# Patient Record
Sex: MALE | Race: OTHER | HISPANIC OR LATINO | Employment: FULL TIME | URBAN - METROPOLITAN AREA
[De-identification: names, ages, dates, MRNs, and addresses within clinical notes are randomized per-mention and may not be internally consistent; named-entity substitution may affect disease eponyms.]

---

## 2019-05-31 ENCOUNTER — HOSPITAL ENCOUNTER (EMERGENCY)
Facility: MEDICAL CENTER | Age: 34
End: 2019-05-31
Attending: EMERGENCY MEDICINE
Payer: OTHER MISCELLANEOUS

## 2019-05-31 VITALS
OXYGEN SATURATION: 94 % | BODY MASS INDEX: 38.95 KG/M2 | HEART RATE: 60 BPM | TEMPERATURE: 98.5 F | RESPIRATION RATE: 18 BRPM | WEIGHT: 219.8 LBS | HEIGHT: 63 IN | SYSTOLIC BLOOD PRESSURE: 153 MMHG | DIASTOLIC BLOOD PRESSURE: 108 MMHG

## 2019-05-31 DIAGNOSIS — S61.210A LACERATION OF RIGHT INDEX FINGER WITHOUT FOREIGN BODY WITHOUT DAMAGE TO NAIL, INITIAL ENCOUNTER: ICD-10-CM

## 2019-05-31 PROCEDURE — 700101 HCHG RX REV CODE 250: Performed by: EMERGENCY MEDICINE

## 2019-05-31 PROCEDURE — 99283 EMERGENCY DEPT VISIT LOW MDM: CPT

## 2019-05-31 PROCEDURE — 304999 HCHG REPAIR-SIMPLE/INTERMED LEVEL 1

## 2019-05-31 PROCEDURE — 303747 HCHG EXTRA SUTURE

## 2019-05-31 RX ORDER — LIDOCAINE HYDROCHLORIDE 10 MG/ML
20 INJECTION, SOLUTION INFILTRATION; PERINEURAL ONCE
Status: COMPLETED | OUTPATIENT
Start: 2019-05-31 | End: 2019-05-31

## 2019-05-31 RX ORDER — CEPHALEXIN 500 MG/1
500 CAPSULE ORAL 4 TIMES DAILY
Qty: 20 CAP | Refills: 0 | Status: SHIPPED | OUTPATIENT
Start: 2019-05-31 | End: 2019-06-05

## 2019-05-31 RX ADMIN — LIDOCAINE HYDROCHLORIDE 20 ML: 10 INJECTION, SOLUTION INFILTRATION; PERINEURAL at 23:00

## 2019-05-31 NOTE — LETTER
"  FORM C-4:  EMPLOYEE’S CLAIM FOR COMPENSATION/ REPORT OF INITIAL TREATMENT  EMPLOYEE’S CLAIM - PROVIDE ALL INFORMATION REQUESTED   First Name  Cornelio Last Name  Moise Birthdate             Age  1985 34 y.o. 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 [address]   What were you doing at the time of accident? (if applicable)  were pulling out a metal and the metal cut you    How did this injury or occupational disease occur? Be specific and answer in detail. Use additional sheet if necessary)  were pulling out a metal and the metal cut you   If you believe that you have an occupational disease, when did you first have knowledge of the disability and it relationship to your employment?  N/A Witnesses to the Accident  None     Nature of Injury or Occupational Disease  Workers' Compensation  Part(s) of Body Injured or Affected  Hand (R), Finger (R),     I certify that the above is true and correct to the best of my knowledge and that I " have provided this information in order to obtain the benefits of [place] or Chapter 617 of [place]  I hereby authorize any physician, chiropractor, surgeon, practitioner, or other person, any hospital, including [facility] hospital, any medical service organization, any insurance company, or other institution or organization to release to each other, any medical or other information, including benefits paid or payable, pertinent to this injury or disease, except information relative to diagnosis, treatment and/or counseling for AIDS, psychological conditions, alcohol or controlled substances, for which I must give specific authorization.  A Photostat of this authorization shall be as valid as the original.   Date  05/31/2019 [facility] ED Employee’s Signature   THIS REPORT MUST BE COMPLETED AND MAILED WITHIN 3 WORKING DAYS OF TREATMENT   Place  [facility], EMERGENCY DEPT  Name of Facility   [facility]   Date  5/31/2019 Diagnosis  (S61.210A) Laceration of right index finger without foreign body without damage to nail, initial encounter Is there evidence the injured employee was under the influence of alcohol and/or another controlled substance at the time of accident?   Hour  11:26 PM Description of Injury or Disease  Laceration of right index finger without foreign body without damage to nail, initial encounter No   Treatment  Right second finger laceration sutured, placed on 5-day course of Keflex  Have you advised the patient to remain off work five days or more?         No   X-Ray Findings      If Yes   From Date    To Date      From information given by the employee, together with medical evidence, can you directly connect this injury or occupational disease as job incurred?  Yes If No, is the employee capable of: Full Duty  No Modified Duty  Yes   Is  "additional medical care by a physician indicated?  Yes  Comments:Suture removal, possible need for wound check for any delayed healing or evidence of infection If Modified Duty, Specify any Limitations / Restrictions  Avoid lifting with injured finger 1 week unless cleared to resume normal activity by occupational medicine doctor     Do you know of any previous injury or disease contributing to this condition or occupational disease?  No   Date  5/31/2019 Print Doctor’s Name  Lenny Mock certify the employer’s copy of this form was mailed on:   Address  [address] [place] [address] [phone] Insurer’s Use Only   [facility]  [address]    Provider’s Tax ID Number  [id number] Telephone  Dept: [phone]    Doctor’s Signature  e-LENNY Ponce M.D. Degree   MD    Original - TREATING PHYSICIAN OR CHIROPRACTOR   Pg 2-Insurer/TPA   Pg 3-Employer   Pg 4-Employee                                                                                                  Form C-4 (rev01/03)     BRIEF DESCRIPTION OF RIGHTS AND BENEFITS  (Pursuant to NRS 616C.050)  Notice of Injury or Occupational Disease (Incident Report Form C-1): If an injury or occupational disease (OD) arises out of and in the course of employment, you must provide written notice to your employer as soon as practicable, but no later than 7 days after the accident or OD. Your employer shall maintain a sufficient supply of the required forms.  Claim for Compensation (Form C-4): If medical treatment is sought, the form C-4 is available at the place of initial treatment. A completed \"Claim for Compensation\" (Form C-4) must be filed within 90 days after an accident or OD. The treating physician or chiropractor must, within 3 working days after treatment, complete and mail to the employer, the employer's insurer and third-party , the Claim for Compensation.  Medical Treatment: If you require medical treatment " for your on-the-job injury or OD, you may be required to select a physician or chiropractor from a list provided by your workers’ compensation insurer, if it has contracted with an Organization for Managed Care (MCO) or Preferred Provider Organization (PPO) or providers of health care. If your employer has not entered into a contract with an MCO or PPO, you may select a physician or chiropractor from the Panel of Physicians and Chiropractors. Any medical costs related to your industrial injury or OD will be paid by your insurer.  Temporary Total Disability (TTD): If your doctor has certified that you are unable to work for a period of at least 5 consecutive days, or 5 cumulative days in a 20-day period, or places restrictions on you that your employer does not accommodate, you may be entitled to TTD compensation.  Temporary Partial Disability (TPD): If the wage you receive upon reemployment is less than the compensation for TTD to which you are entitled, the insurer may be required to pay you TPD compensation to make up the difference. TPD can only be paid for a maximum of 24 months.  Permanent Partial Disability (PPD): When your medical condition is stable and there is an indication of a PPD as a result of your injury or OD, within 30 days, your insurer must arrange for an evaluation by a rating physician or chiropractor to determine the degree of your PPD. The amount of your PPD award depends on the date of injury, the results of the PPD evaluation and your age and wage.  Permanent Total Disability (PTD): If you are medically certified by a treating physician or chiropractor as permanently and totally disabled and have been granted a PTD status by your insurer, you are entitled to receive monthly benefits not to exceed 66 2/3% of your average monthly wage. The amount of your PTD payments is subject to reduction if you previously received a PPD award.  Vocational Rehabilitation Services: You may be eligible for  vocational rehabilitation services if you are unable to return to the job due to a permanent physical impairment or permanent restrictions as a result of your injury or occupational disease.  Transportation and Per Saul Reimbursement: You may be eligible for travel expenses and per saul associated with medical treatment.  Reopening: You may be able to reopen your claim if your condition worsens after claim closure.  Appeal Process: If you disagree with a written determination issued by the insurer or the insurer does not respond to your request, you may appeal to the Department of Administration, , by following the instructions contained in your determination letter. You must appeal the determination within 70 days from the date of the determination letter at [address], [address], [place], [place] [address], or [address] [facility] [address], [place], [place] [address]. If you disagree with the  decision, you may appeal to the Department of Administration, . You must file your appeal within 30 days from the date of the  decision letter at [address], [address], [place], [place] [address], or [address], [address], [place], [place] [address]. If you disagree with a decision of an , you may file a petition for judicial review with the District Court. You must do so within 30 days of the Appeal Officer’s decision. You may be represented by an  at your own expense or you may contact the [facility] for possible representation.  [place]  for Injured Workers (NAIW): If you disagree with a  decision, you may request that NAIW represent you without charge at an  Hearing. For information regarding denial of benefits, you may contact the [facility] at: [address], [address], [place] [address], [phone], or [address], [address], [place] [address], [phone]  [phone]  To File a Complaint with the Division: If you wish to file a complaint with the  of the Division of Industrial Relations (DIR), please contact the Workers’ Compensation Section, [address], [address], [place], [place] [address], telephone ([phone], or [address] [facility] [address], [address], [place], [place] [address], telephone [phone].  For assistance with Workers’ Compensation Issues: you may contact the Office of the Governor Consumer Health Assistance, [address] [place] [address], [address], [place], [place] [address], Toll Free 1-923.709.3183, Web site: http://Frankly.[facility].nv.us, E-mail [email or web address].                                                                                                                                                                                     __________________________________________________________________                                    _________________            Employee Name / Signature                                                                                                                            Date       05/31/2019                                D-2 (rev. 10/07)

## 2019-05-31 NOTE — LETTER
"  FORM C-4:  EMPLOYEE’S CLAIM FOR COMPENSATION/ REPORT OF INITIAL TREATMENT  EMPLOYEE’S CLAIM - PROVIDE ALL INFORMATION REQUESTED   First Name  Cornelio Last Name  Moise Birthdate             Age  1985 34 y.o. Sex  male Claim Number   Home Employee Address  [address]  [facility]                                     Zip  [address] Height  1.6 m (5' 3\") Weight  99.7 kg (219 lb 12.8 oz) N  [id number]   Mailing Employee Address                           [address]   [facility]               Zip  [address] Telephone  [phone] (home)  Primary Language Spoken  ENGLISH   Insurer  *** Third Party   N/A Employee's Occupation (Job Title) When Injury or Occupational Disease Occurred     Employer's Name   Telephone      Employer Address   City   State   Zip     Date of Injury  5/31/2019       Hour of Injury  9:45 PM Date Employer Notified  5/31/2019 Last Day of Work after Injury or Occupational Disease  5/31/2019 Supervisor to Whom Injury Reported  Wisam Dunlap   Address or Location of Accident (if applicable)     What were you doing at the time of accident? (if applicable)  were pulling out a metal and the metal cut you    How did this injury or occupational disease occur? Be specific and answer in detail. Use additional sheet if necessary)  were pulling out a metal and the metal cut you   If you believe that you have an occupational disease, when did you first have knowledge of the disability and it relationship to your employment?  N/A Witnesses to the Accident  None     Nature of Injury or Occupational Disease  Workers' Compensation  Part(s) of Body Injured or Affected  Hand (R), Finger (R), N/A    I certify that the above is true and correct to the best of my knowledge and that I have provided this information in order to obtain the benefits of [place]’s [place] ([place]A [place], inclusive or Chapter 617 of [place]RS).  " I hereby authorize any physician, chiropractor, surgeon, practitioner, or other person, any hospital, including [facility] or [facility], any medical service organization, any insurance company, or other institution or organization to release to each other, any medical or other information, including benefits paid or payable, pertinent to this injury or disease, except information relative to diagnosis, treatment and/or counseling for AIDS, psychological conditions, alcohol or controlled substances, for which I must give specific authorization.  A Photostat of this authorization shall be as valid as the original.   Date Place   Employee’s Signature   THIS REPORT MUST BE COMPLETED AND MAILED WITHIN 3 WORKING DAYS OF TREATMENT   Place  [facility], EMERGENCY DEPT  Name of Facility   [facility]   Date  5/31/2019 Diagnosis  (S61.210A) Laceration of right index finger without foreign body without damage to nail, initial encounter Is there evidence the injured employee was under the influence of alcohol and/or another controlled substance at the time of accident?   Hour  11:20 PM Description of Injury or Disease  Laceration of right index finger without foreign body without damage to nail, initial encounter     Treatment     Have you advised the patient to remain off work five days or more?             X-Ray Findings      If Yes   From Date    To Date      From information given by the employee, together with medical evidence, can you directly connect this injury or occupational disease as job incurred?    If No, is the employee capable of: Full Duty    Modified Duty      Is additional medical care by a physician indicated?    If Modified Duty, Specify any Limitations / Restrictions        Do you know of any previous injury or disease contributing to this condition or occupational disease?      Date  5/31/2019 Print Doctor’s Name  Lenny Mock I certify  "the employer’s copy of this form was mailed on:   Address  [address]  [place] [id number] [phone] Insurer’s Use Only   [facility]  [address]    Provider’s Tax ID Number  [id number] Telephone  Dept: [phone]    Doctor’s Signature    Degree       Original - TREATING PHYSICIAN OR CHIROPRACTOR   Pg 2-Insurer/TPA   Pg 3-Employer   Pg 4-Employee                                                                                                  Form C-4 (rev01/03)     BRIEF DESCRIPTION OF RIGHTS AND BENEFITS  ([place]ursuant to [place]RS 616C.050)    Notice of Injury or Occupational Disease (Incident Report Form C-1): If an injury or occupational disease (OD) arises out of and in the course of employment, you must provide written notice to your employer as soon as practicable, but no later than 7 days after the accident or OD. Your employer shall maintain a sufficient supply of the required forms.    Claim for Compensation (Form C-4): If medical treatment is sought, the form C-4 is available at the place of initial treatment. A completed \"Claim for Compensation\" (Form C-4) must be filed within 90 days after an accident or OD. The treating physician or chiropractor must, within 3 working days after treatment, complete and mail to the employer, the employer's insurer and third-party , the Claim for Compensation.    Medical Treatment: If you require medical treatment for your on-the-job injury or OD, you may be required to select a physician or chiropractor from a list provided by your workers’ compensation insurer, if it has contracted with an Organization for Managed Care (MCO) or Preferred Provider Organization (PPO) or providers of health care. If your employer has not entered into a contract with an MCO or PPO, you may select a physician or chiropractor from the Panel of Physicians and Chiropractors. Any medical costs related to your industrial injury or OD will be paid by your " insurer.    Temporary Total Disability (TTD): If your doctor has certified that you are unable to work for a period of at least 5 consecutive days, or 5 cumulative days in a 20-day period, or places restrictions on you that your employer does not accommodate, you may be entitled to TTD compensation.    Temporary Partial Disability (TPD): If the wage you receive upon reemployment is less than the compensation for TTD to which you are entitled, the insurer may be required to pay you TPD compensation to make up the difference. TPD can only be paid for a maximum of 24 months.    Permanent Partial Disability (PPD): When your medical condition is stable and there is an indication of a PPD as a result of your injury or OD, within 30 days, your insurer must arrange for an evaluation by a rating physician or chiropractor to determine the degree of your PPD. The amount of your PPD award depends on the date of injury, the results of the PPD evaluation and your age and wage.    Permanent Total Disability (PTD): If you are medically certified by a treating physician or chiropractor as permanently and totally disabled and have been granted a PTD status by your insurer, you are entitled to receive monthly benefits not to exceed 66 2/3% of your average monthly wage. The amount of your PTD payments is subject to reduction if you previously received a PPD award.    Vocational Rehabilitation Services: You may be eligible for vocational rehabilitation services if you are unable to return to the job due to a permanent physical impairment or permanent restrictions as a result of your injury or occupational disease.    Transportation and Per Saul Reimbursement: You may be eligible for travel expenses and per saul associated with medical treatment.  Reopening: You may be able to reopen your claim if your condition worsens after claim closure.    Appeal Process: If you disagree with a written determination issued by the insurer or the  insurer does not respond to your request, you may appeal to the Department of Administration, , by following the instructions contained in your determination letter. You must appeal the determination within 70 days from the date of the determination letter at [address], [address], [place], [place] [address], or [address], [address], [place], [place] [address]. If you disagree with the  decision, you may appeal to the Department of Administration, . You must file your appeal within 30 days from the date of the  decision letter at [address], [address], [place], [place] [address], or [address], [address], [place], [place] [address]. If you disagree with a decision of an , you may file a petition for judicial review with the District Court. You must do so within 30 days of the Appeal Officer’s decision. You may be represented by an  at your own expense or you may contact the [facility] for possible representation.    [place]  for Injured Workers ([place]IW): If you disagree with a  decision, you may request that NAIW represent you without charge at an  Hearing. For information regarding denial of benefits, you may contact the [facility] at: [address], [address], [place] [address], [phone], or [address], [address], [place] [address], [phone]    To File a Complaint with the Division: If you wish to file a complaint with the  of the Division of Industrial Relations (DIR), please contact the Workers’ Compensation Section, [address], [address], [place], [place] [address], telephone ([phone]) [phone], or [address], [place] [address], telephone [phone].    For assistance with Workers’ Compensation Issues: you may contact the Office of the [facility] Consumer Health Assistance, [address], [address], [place], [place] [address], Toll Free 1-300.315.7803, Web site: http://julissa.[facility].nv., E-mail [email or web address].[email or web address].                                                                                                                                                                               __________________________________________________________________                                    _________________            Employee Name / Signature                                                                                                                            Date                                       D-2 (rev. 10/07)

## 2019-06-01 NOTE — ED TRIAGE NOTES
"Cornelio Phipps   34 y.o. male   Chief Complaint   Patient presents with   • Laceration     right index finger laceration      Pt amb to triage with steady gait for above complaint. Says he was working with metal and cut his finger. Bleeding controlled in triage.      Pt is alert and oriented, speaking in full sentences, follows commands and responds appropriately to questions. NAD. Resp are even and unlabored.   Pt placed in lobby. Pt educated on triage process. Pt encouraged to alert staff for any changes.    /100   Pulse 60   Temp 36.9 °C (98.5 °F) (Temporal)   Resp 18   Ht 1.6 m (5' 3\")   Wt 99.7 kg (219 lb 12.8 oz)   SpO2 94%   BMI 38.94 kg/m²     "

## 2019-06-01 NOTE — DISCHARGE INSTRUCTIONS
Sutures out in 8 days    Follow-up at occupational medicine clinic    Return for any concern of infection    Avoid lifting with injured finger

## 2019-06-01 NOTE — ED PROVIDER NOTES
"ED Provider Note    CHIEF COMPLAINT   Chief Complaint   Patient presents with   • Laceration     right index finger laceration       ODILIA Phipps is a 34 y.o. male who presents with right second finger laceration, after it was accidentally dragged across a sharp piece of metal.  Last tetanus shot within the past 5 years.  Pain is minimal.  No loss of function of the finger, able to fully flex and extend.  Injury occurred 2 hours ago, it was work-related    REVIEW OF SYSTEMS   Musculoskeletal: Right second finger pain  Neurologic: No numbness  Skin: Right second finger laceration      PAST MEDICAL HISTORY   History reviewed. No pertinent past medical history.    FAMILY HISTORY  No family history on file.    SOCIAL HISTORY  Social History     Social History   • Marital status:      Spouse name: N/A   • Number of children: N/A   • Years of education: N/A     Social History Main Topics   • Smoking status: Current Every Day Smoker   • Smokeless tobacco: Not on file      Comment: occassional   • Alcohol use Yes      Comment: occassional   • Drug use: No   • Sexual activity: Not on file     Other Topics Concern   • Not on file     Social History Narrative   • No narrative on file       SURGICAL HISTORY  History reviewed. No pertinent surgical history.    CURRENT MEDICATIONS   Home Medications    **Home medications have not yet been reviewed for this encounter**         ALLERGIES   Allergies   Allergen Reactions   • Nkda [No Known Drug Allergy]        PHYSICAL EXAM  VITAL SIGNS: /100   Pulse 60   Temp 36.9 °C (98.5 °F) (Temporal)   Resp 18   Ht 1.6 m (5' 3\")   Wt 99.7 kg (219 lb 12.8 oz)   SpO2 94%   BMI 38.94 kg/m²   Skin: Dorsal flap-like laceration across the proximal phalanx, laceration approximately 2.5 cm long.  No evidence of foreign body on examination.   Vascular: Intact distal capillary refill.   Musculoskeletal: Flexion and extension of the injured finger is normal.  No bony " abnormality/deformity.  Neurologic: Patient is intact to the right fingertip    RADIOLOGY/PROCEDURES  Laceration Repair Procedure Note    Indication: Laceration    Procedure: The patient was placed in the appropriate position and anesthesia around the laceration was obtained by infiltration using 1% Lidocaine without epinephrine. The area was then cleansed with betadine and draped in a sterile fashion and irrigated with high pressure normal saline. The laceration was closed with 4-0 Ethilon using interrupted sutures. There were no additional lacerations requiring repair. The wound area was then dressed with bacitracin and a bandage.      Total repaired wound length: 2.5 cm.     Other Items: Suture count: 4    The patient tolerated the procedure well.    Complications: None          COURSE & MEDICAL DECISION MAKING  Pertinent Labs & Imaging studies reviewed. (See chart for details)  Laceration did not cross the joint.  Sutures placed, patient to have sutures out in 8 days.  He will follow-up with occupational medicine.  He is advised not to lift with the injured finger until sutures removed or cleared to do so earlier by occupational medicine physician.  Signs and symptoms of infection were reviewed with the patient.  He is advised to return for any concern of this.    FINAL IMPRESSION     1. Laceration of right index finger without foreign body without damage to nail, initial encounter              Electronically signed by: Lenny Mock, 5/31/2019 10:38 PM

## 2019-06-01 NOTE — ED NOTES
Pt understands discharge instructions follow up and prescriptions, wound irrigated, bandage placed, atb ointment applied.

## 2019-06-24 ENCOUNTER — HOSPITAL ENCOUNTER (EMERGENCY)
Facility: MEDICAL CENTER | Age: 34
End: 2019-06-24
Attending: EMERGENCY MEDICINE
Payer: COMMERCIAL

## 2019-06-24 ENCOUNTER — APPOINTMENT (OUTPATIENT)
Dept: RADIOLOGY | Facility: MEDICAL CENTER | Age: 34
End: 2019-06-24
Attending: EMERGENCY MEDICINE
Payer: COMMERCIAL

## 2019-06-24 VITALS
RESPIRATION RATE: 18 BRPM | OXYGEN SATURATION: 97 % | HEART RATE: 64 BPM | DIASTOLIC BLOOD PRESSURE: 90 MMHG | HEIGHT: 63 IN | BODY MASS INDEX: 39.61 KG/M2 | WEIGHT: 223.55 LBS | TEMPERATURE: 98.4 F | SYSTOLIC BLOOD PRESSURE: 135 MMHG

## 2019-06-24 DIAGNOSIS — L02.91 PHLEGMON: ICD-10-CM

## 2019-06-24 DIAGNOSIS — L02.91 ABSCESS: ICD-10-CM

## 2019-06-24 LAB — GLUCOSE BLD-MCNC: 93 MG/DL (ref 65–99)

## 2019-06-24 PROCEDURE — A9270 NON-COVERED ITEM OR SERVICE: HCPCS | Performed by: EMERGENCY MEDICINE

## 2019-06-24 PROCEDURE — 700102 HCHG RX REV CODE 250 W/ 637 OVERRIDE(OP): Performed by: EMERGENCY MEDICINE

## 2019-06-24 PROCEDURE — 99285 EMERGENCY DEPT VISIT HI MDM: CPT

## 2019-06-24 PROCEDURE — 303977 HCHG I & D

## 2019-06-24 PROCEDURE — 76536 US EXAM OF HEAD AND NECK: CPT

## 2019-06-24 PROCEDURE — 82962 GLUCOSE BLOOD TEST: CPT

## 2019-06-24 RX ORDER — SULFAMETHOXAZOLE AND TRIMETHOPRIM 800; 160 MG/1; MG/1
1 TABLET ORAL 2 TIMES DAILY
Qty: 14 TAB | Refills: 0 | Status: SHIPPED | OUTPATIENT
Start: 2019-06-24 | End: 2019-07-01

## 2019-06-24 RX ORDER — HYDROCODONE BITARTRATE AND ACETAMINOPHEN 5; 325 MG/1; MG/1
1 TABLET ORAL EVERY 6 HOURS PRN
Qty: 16 TAB | Refills: 0 | Status: SHIPPED | OUTPATIENT
Start: 2019-06-24 | End: 2019-06-28

## 2019-06-24 RX ORDER — CEPHALEXIN 500 MG/1
500 CAPSULE ORAL 3 TIMES DAILY
Qty: 21 CAP | Refills: 0 | Status: SHIPPED | OUTPATIENT
Start: 2019-06-24 | End: 2019-07-01

## 2019-06-24 RX ORDER — CEPHALEXIN 500 MG/1
500 CAPSULE ORAL ONCE
Status: COMPLETED | OUTPATIENT
Start: 2019-06-24 | End: 2019-06-24

## 2019-06-24 RX ORDER — SULFAMETHOXAZOLE AND TRIMETHOPRIM 800; 160 MG/1; MG/1
1 TABLET ORAL ONCE
Status: COMPLETED | OUTPATIENT
Start: 2019-06-24 | End: 2019-06-24

## 2019-06-24 RX ORDER — OXYCODONE HYDROCHLORIDE AND ACETAMINOPHEN 5; 325 MG/1; MG/1
1 TABLET ORAL ONCE
Status: COMPLETED | OUTPATIENT
Start: 2019-06-24 | End: 2019-06-24

## 2019-06-24 RX ADMIN — SULFAMETHOXAZOLE AND TRIMETHOPRIM 1 TABLET: 800; 160 TABLET ORAL at 23:20

## 2019-06-24 RX ADMIN — OXYCODONE HYDROCHLORIDE AND ACETAMINOPHEN 1 TABLET: 5; 325 TABLET ORAL at 23:21

## 2019-06-24 RX ADMIN — CEPHALEXIN 500 MG: 500 CAPSULE ORAL at 23:20

## 2019-06-25 ENCOUNTER — HOSPITAL ENCOUNTER (EMERGENCY)
Facility: MEDICAL CENTER | Age: 34
End: 2019-06-25
Attending: EMERGENCY MEDICINE
Payer: COMMERCIAL

## 2019-06-25 VITALS
RESPIRATION RATE: 16 BRPM | SYSTOLIC BLOOD PRESSURE: 142 MMHG | TEMPERATURE: 96.9 F | OXYGEN SATURATION: 98 % | WEIGHT: 222.88 LBS | BODY MASS INDEX: 39.49 KG/M2 | HEART RATE: 61 BPM | DIASTOLIC BLOOD PRESSURE: 96 MMHG | HEIGHT: 63 IN

## 2019-06-25 DIAGNOSIS — Z51.89 ENCOUNTER FOR WOUND RE-CHECK: ICD-10-CM

## 2019-06-25 PROCEDURE — 99283 EMERGENCY DEPT VISIT LOW MDM: CPT

## 2019-06-25 NOTE — ED PROVIDER NOTES
ED Provider Note    HPI: Patient is a 34-year-old male who presented to the emergency department June 24, 2019 at 7:50 PM with a chief complaint of neck pain.    Patient has swelling and discomfort in the right side of his neck posteriorly.  The triage note reflects left-sided pain but this is an error.  Symptoms began several days ago.  Has had no nausea or vomiting.  No fever no chills no cough.  No chest pain no shortness of breath.  No other somatic complaint    Review of Systems: Positive right-sided posterior neck pain negative for nausea vomiting fever chills cough chest pain shortness of breath.  Review of systems reviewed with patient, all other systems negative    Past medical/surgical history: None    Medications: None    Allergies: None    Social History: Patient smokes 1/2 pack of cigarettes per day occasional use of alcohol      Physical exam: Constitutional: Pleasant male awake alert  Vital signs: Temperature 98.8 blood pressure 152/100 pulse 86 respirations 16 pulse oximetry 95%  EYES: PERRL, EOMI, Conjunctivae and sclera normal, eyelids normal bilaterally.  Neck: Trachea midline.  Indurated and tender area right posterior lateral neck region.  No drainage or fluctuance is present except at the lateralmost portion of the area where there was some fluctuance present.  No evidence of abnormality in the cervical midline.  Cardiac: Regular rate and rhythm. S1-S2 present. No S3 or S4 present. No murmurs, rubs, or gallops heard. No edema or varicosities were seen.   Lungs: Clear to auscultation with good aeration throughout. No wheezes, rales, or rhonchi heard. Patient's chest wall moved symmetrically with each respiratory effort. Patient was not making use of accessory muscles of respiration in breathing.  Abdomen: Soft nontender to palpation. No rebound or guarding elicited. No organomegaly identified. No pulsatile abdominal masses identified.   Musculoskeletal:  no  pain with palpitation or movement of  muscle, bone or joint , no obvious musculoskeletal deformities identified.  Neurologic: alert and awake answers questions appropriately. Moves all four extremities independently, no gross focal abnormalities identified. Normal strength and motor.  Skin: See cervical exam.  No other rash or lesion seen, no other palpable dermatologic lesions identified.  Psychiatric: not anxious, delusional, or hallucinating.    Medical decision making: I was uncertain as to whether this represented an abscess or phlegmon.  Ultrasound obtained; complex hypoechoic structure was noted within the subcutaneous soft tissues of the right posterior neck.  This felt to be represent either developing abscess or phlegmon    Fingerstick blood sugar obtained, 93    I discussed the findings and ultrasound with the patient.  There was a portion on the right lateral neck that I felt was slightly fluctuant.  I felt this was the most logical area to attempt drainage.  Patient anesthetized with injection of a total of 4 cc 1% lidocaine without epinephrine.  Using a #11 scalpel blade the fluctuant area was incised and copious foul-smelling yellow pus was expressed.  Iodoform packing 1/4 inch was placed.  Dressing applied.    Patient be discharged on Bactrim Keflex and Norco.  The patient is to return tomorrow for recheck.  I told the patient there is a risk that he will develop an increased infection.  He is given his first dose of antibiotics in the department.  The patient drove here so he cannot be given any oral pain medications in the department.  I did dispense 1 Percocet tablet to him (witnessed by nursing staff but dispensed by me) to take once he arrives home.    Patient given discharge instructions for abscess care.  Patient verbalized understanding of the above instructions including the importance of returning to the ED tomorrow for recheck    Impression neck abscess, I&D  2) neck phlegmon

## 2019-06-25 NOTE — DISCHARGE PLANNING
Called patient per request of Dr. Arana and left VM to please call.  Dr. Arana would like patient to follow up today again for his abscess.

## 2019-06-25 NOTE — ED TRIAGE NOTES
"Cornelio Phipps   34 y.o. male   Chief Complaint   Patient presents with   • Abscess     left neck. says he could not wait till nect friday to see his doctor. unable to turn his head completely.      Pt amb to triage with steady gait for above complaint.      Pt is alert and oriented, speaking in full sentences, follows commands and responds appropriately to questions. NAD. Resp are even and unlabored.   Pt placed in lobby. Pt educated on triage process. Pt encouraged to alert staff for any changes.    /100   Pulse 86   Temp 37.1 °C (98.8 °F) (Temporal)   Resp 16   Ht 1.6 m (5' 3\")   Wt 101.4 kg (223 lb 8.7 oz)   SpO2 95%   BMI 39.60 kg/m²     "

## 2019-06-25 NOTE — ED NOTES
4x4 gauze and tegaderm applied to patient neck wound.  Cornelio Moise discharged via ambulatory with steady gait.  Discharge instructions given and reviewed, patient educated to follow up with ER tomorrow, verbalized understanding.  Prescriptions given x 3.  All personal belongings in possession.  No questions at this time.

## 2019-06-25 NOTE — DISCHARGE PLANNING
Patient returned call and will come in, as requested, sometime this afternoon.  He is currently at work and will come when able.  Dr. Lewis updated.

## 2019-06-26 NOTE — ED NOTES
Wound redressed.  Pt discharged, discharge instructions given. Verbalizes understanding. VSS on RA. All belongings accounted for. Pt ambulated with steady gait to ED lobby.

## 2019-06-26 NOTE — ED PROVIDER NOTES
"ED Provider Note    CHIEF COMPLAINT  Chief Complaint   Patient presents with   • Wound Re-Check     Seen at 6:01 PM    HPI  Cornelio Phipps is a 34 y.o. male who presents for a wound recheck.  He was seen yesterday approximately 23 hours ago, he had a large abscess drained on the right posterior side of his neck.  The wound was packed and he was discharged with Bactrim, Keflex and Norco.  The patient has been taking the medications.  He feels the symptoms have possibly improved slightly, certainly they are no worse than they were yesterday he denies any fevers, chills or impaired immunity.    REVIEW OF SYSTEMS  See HPI  PAST MEDICAL HISTORY   Denies    SOCIAL HISTORY  Social History     Social History Main Topics   • Smoking status: Never Smoker   • Smokeless tobacco: Never Used      Comment: occassional   • Alcohol use Yes      Comment: occassional   • Drug use: No   • Sexual activity: Not on file       SURGICAL HISTORY  patient denies any surgical history    CURRENT MEDICATIONS  Reviewed.  See Encounter Summary.     ALLERGIES  Allergies   Allergen Reactions   • Nkda [No Known Drug Allergy]        PHYSICAL EXAM  VITAL SIGNS: /96   Pulse 61   Temp 36.1 °C (96.9 °F) (Temporal)   Resp 16   Ht 1.6 m (5' 3\")   Wt 101.1 kg (222 lb 14.2 oz)   SpO2 98%   BMI 39.48 kg/m²   Constitutional: Awake, alert in no apparent distress.  HENT: Normocephalic, Bilateral external ears normal. Nose normal.  Neck is supple with full range of motion.  No cervical lymphadenopathy.  Eyes: Conjunctiva normal, non-icteric, EOMI.    Thorax & Lungs: Easy unlabored respirations  Cardiovascular:    Abdomen:  No distention  Skin: Approximately 4 cm area of induration, mild erythema and a stab incision centrally over the right posterior neck.  The gauze is removed and a small amount of purulence and serous drainage was expressed.  The overlying skin has some mild warmth.  There appears to be some tiny little pustular lesions surrounding " the stab incision.  Extremities:   atraumatic   Neurologic: Alert, Grossly non-focal.   Psychiatric: Affect and Mood normal    RADIOLOGY  No orders to display       Nursing notes and vital signs were reviewed. (See chart for details)    Decision Making:  This is a 34 y.o. year old male who presents for a wound check.  It is unlikely that he will have any significant improvement in 23 hours.  There has been no worsening of his symptoms, he does not have any signs of a deep space abscess, the neck is supple and he is able to range it without any pain.  A topical antibiotic was applied, he should continue the Keflex and Bactrim.  I explained that it may take 2 weeks for complete resolution, he should notice significant improvement in his symptoms in the next 24 to 48 hours.  If he notes any fevers, worsening pain, pain with range of motion or any other concern he needs to return immediately for repeat evaluation.    DISPOSITION:  Patient will be discharged home in good condition.    Discharge Medications:  New Prescriptions    No medications on file       The patient was discharged home (see d/c instructions) and told to return immediately for any signs or symptoms listed, or any worsening at all.  The patient verbally agreed to the discharge precautions and follow-up plan which is documented in EPIC.          FINAL IMPRESSION  1. Encounter for wound re-check

## 2019-06-26 NOTE — ED TRIAGE NOTES
33 y/o male ambulatory to triage for a recheck of a wound on the right side of his neck. Pt was seen yesterday and had the area lanced and drained, packing in place with dressing.

## 2020-11-27 ENCOUNTER — HOSPITAL ENCOUNTER (OUTPATIENT)
Dept: LAB | Facility: MEDICAL CENTER | Age: 35
End: 2020-11-27
Attending: NURSE PRACTITIONER
Payer: COMMERCIAL

## 2020-11-27 LAB — COVID ORDER STATUS COVID19: NORMAL

## 2020-11-27 PROCEDURE — U0003 INFECTIOUS AGENT DETECTION BY NUCLEIC ACID (DNA OR RNA); SEVERE ACUTE RESPIRATORY SYNDROME CORONAVIRUS 2 (SARS-COV-2) (CORONAVIRUS DISEASE [COVID-19]), AMPLIFIED PROBE TECHNIQUE, MAKING USE OF HIGH THROUGHPUT TECHNOLOGIES AS DESCRIBED BY CMS-2020-01-R: HCPCS

## 2020-11-27 PROCEDURE — C9803 HOPD COVID-19 SPEC COLLECT: HCPCS

## 2020-11-28 LAB
SARS-COV-2 RNA RESP QL NAA+PROBE: DETECTED
SPECIMEN SOURCE: ABNORMAL

## 2021-08-27 ENCOUNTER — OFFICE VISIT (OUTPATIENT)
Dept: URGENT CARE | Facility: CLINIC | Age: 36
End: 2021-08-27
Payer: COMMERCIAL

## 2021-08-27 ENCOUNTER — APPOINTMENT (OUTPATIENT)
Dept: URGENT CARE | Facility: CLINIC | Age: 36
End: 2021-08-27
Payer: COMMERCIAL

## 2021-08-27 ENCOUNTER — HOSPITAL ENCOUNTER (OUTPATIENT)
Facility: MEDICAL CENTER | Age: 36
End: 2021-08-27
Attending: NURSE PRACTITIONER
Payer: COMMERCIAL

## 2021-08-27 VITALS
WEIGHT: 220 LBS | RESPIRATION RATE: 16 BRPM | OXYGEN SATURATION: 97 % | DIASTOLIC BLOOD PRESSURE: 86 MMHG | HEIGHT: 64 IN | BODY MASS INDEX: 37.56 KG/M2 | TEMPERATURE: 99.1 F | HEART RATE: 70 BPM | SYSTOLIC BLOOD PRESSURE: 122 MMHG

## 2021-08-27 DIAGNOSIS — J06.9 VIRAL UPPER RESPIRATORY TRACT INFECTION WITH COUGH: ICD-10-CM

## 2021-08-27 DIAGNOSIS — J02.0 ACUTE STREPTOCOCCAL PHARYNGITIS: ICD-10-CM

## 2021-08-27 PROCEDURE — U0005 INFEC AGEN DETEC AMPLI PROBE: HCPCS

## 2021-08-27 PROCEDURE — U0003 INFECTIOUS AGENT DETECTION BY NUCLEIC ACID (DNA OR RNA); SEVERE ACUTE RESPIRATORY SYNDROME CORONAVIRUS 2 (SARS-COV-2) (CORONAVIRUS DISEASE [COVID-19]), AMPLIFIED PROBE TECHNIQUE, MAKING USE OF HIGH THROUGHPUT TECHNOLOGIES AS DESCRIBED BY CMS-2020-01-R: HCPCS

## 2021-08-27 PROCEDURE — 99204 OFFICE O/P NEW MOD 45 MIN: CPT | Performed by: NURSE PRACTITIONER

## 2021-08-27 PROCEDURE — C9803 HOPD COVID-19 SPEC COLLECT: HCPCS

## 2021-08-27 RX ORDER — AMOXICILLIN 500 MG/1
500 CAPSULE ORAL 2 TIMES DAILY
Qty: 20 CAPSULE | Refills: 0 | Status: SHIPPED | OUTPATIENT
Start: 2021-08-27 | End: 2021-09-06

## 2021-08-27 RX ORDER — HYDROXYZINE PAMOATE 25 MG/1
CAPSULE ORAL
COMMUNITY
Start: 2021-06-17

## 2021-08-28 DIAGNOSIS — J06.9 VIRAL UPPER RESPIRATORY TRACT INFECTION WITH COUGH: ICD-10-CM

## 2021-08-28 LAB — COVID ORDER STATUS COVID19: NORMAL

## 2021-08-28 NOTE — PROGRESS NOTES
Chief Complaint   Patient presents with   • Cough         HISTORY OF PRESENT ILLNESS: Patient is a pleasant 36 y.o. male with who presents today due to symptoms which started one week ago. Pt reports a cough, rhinitis, sore throat, chills, fatigue, malaise, shortness of breath, and body aches. Denies chest pain, fever, or wheezing. Denies h/o asthma/copd/CAP. No immunocompromise. Has tried OTC cold medications without significant relief of symptoms. No recent ABX use. No other aggravating or alleviating factors. Reports no known exposure to COVID-19, has been vaccinated.  He is Armenian-speaking,  is used for the entire visit.  He is with his 2 children today, they have both been diagnosed with strep pharyngitis.      Patient Active Problem List    Diagnosis Date Noted   • Lactic acidosis 06/02/2014   • Altered mental status 06/02/2014   • Amphetamine overdose (HCC) 06/02/2014   • Hypokalemia 06/02/2014   • Dehydration 06/02/2014   • Hyponatremia 06/02/2014   • Tobacco abuse 06/02/2014       Allergies:Nkda [no known drug allergy]    Current Outpatient Medications Ordered in Epic   Medication Sig Dispense Refill   • hydrOXYzine pamoate (VISTARIL) 25 MG Cap TAKE 1 CAPSULE ORALLY AS NEEDED FOR ANXIETY EVERY 8 HOURS     • amoxicillin (AMOXIL) 500 MG Cap Take 1 Capsule by mouth 2 times a day for 10 days. 20 Capsule 0     No current Epic-ordered facility-administered medications on file.       History reviewed. No pertinent past medical history.    Social History     Tobacco Use   • Smoking status: Never Smoker   • Smokeless tobacco: Never Used   • Tobacco comment: occassional   Vaping Use   • Vaping Use: Never used   Substance Use Topics   • Alcohol use: Yes     Comment: occassional   • Drug use: No       No family status information on file.   History reviewed. No pertinent family history.    ROS:  Review of Systems   Constitutional: Positive for subjective fever, chills, fatigue, malaise. Negative for  "weight loss.  HENT: Positive for rhinitis, sore throat. Negative for ear pain, nosebleeds, and neck pain.    Eyes: Negative for vision changes.   Cardiovascular: Negative for chest pain, palpitations, orthopnea and leg swelling.   Respiratory: Positive for cough without sputum production. Negative for shortness of breath and wheezing.   Gastrointestinal: Positive for nausea. Negative for abdominal pain, vomiting or diarrhea.   Skin: Negative for rash, diaphoresis.     Exam:  /86   Pulse 70   Temp 37.3 °C (99.1 °F)   Resp 16   Ht 1.626 m (5' 4\")   Wt 99.8 kg (220 lb)   SpO2 97%   General: well-nourished, well-developed male in NAD  Head: normocephalic, atraumatic  Eyes: PERRLA, EOM within normal limits, no conjunctival injection, no scleral icterus, visual fields and acuity grossly intact.  Ears: normal shape and symmetry, no tenderness, no discharge. External canals are without any significant edema or erythema. Tympanic membranes are without any inflammation, no effusion. Gross auditory acuity is intact.  Nose: symmetrical without tenderness, mild discharge, erythema present bilateral nares.  Mouth/Throat: reasonable hygiene, no exudates or tonsillar enlargement. Mild erythema present.   Neck: no masses, range of motion within normal limits, no tracheal deviation.  Lymph: mild cervical adenopathy. No supraclavicular adenopathy.   Neuro: alert and oriented. Cranial nerves 1-12 grossly intact.   Cardiovascular: regular rate and rhythm without murmurs, rubs, or gallops. No edema.   Pulmonary: no distress. Chest is symmetrical with respiration. No wheezes, crackles, or rhonchi.   Musculoskeletal: appropriate muscle tone, gait is stable.  GI: soft, non-tender, no guarding, no hepatosplenomegaly.   Skin: warm, dry, intact, no clubbing, no cyanosis.   Psych: appropriate mood, affect, judgement.       POC strep positive              Assessment/Plan:  1. Viral upper respiratory tract infection with cough  " COVID/SARS CoV-2 PCR   2. Acute streptococcal pharyngitis  amoxicillin (AMOXIL) 500 MG Cap         Patient presents with URI symptoms and concurrent strep infection.  URI symptoms most likely viral, will test for COVID. Home quarantine advised. Discussed natural progression and sx care.  Amoxicillin as directed for strep pharyngitis.  Rest, increase fluids, hand and respiratory hygiene. May take OTC medications as directed for symptom relief. STRICT ER precautions.   Supportive care, differential diagnoses, and indications for immediate follow-up discussed with patient.   Pathogenesis of diagnosis discussed including typical length and natural progression.  Instructed to return to clinic or nearest emergency department for any change in condition, further concerns, or worsening of symptoms.  Patient states understanding of the plan of care and discharge instructions.          MARIA ANTONIA Savage.

## 2021-08-29 LAB
SARS-COV-2 RNA RESP QL NAA+PROBE: NOTDETECTED
SPECIMEN SOURCE: NORMAL

## 2022-06-04 ENCOUNTER — OFFICE VISIT (OUTPATIENT)
Dept: URGENT CARE | Facility: CLINIC | Age: 37
End: 2022-06-04
Payer: COMMERCIAL

## 2022-06-04 VITALS
TEMPERATURE: 98.1 F | DIASTOLIC BLOOD PRESSURE: 80 MMHG | HEIGHT: 65 IN | HEART RATE: 82 BPM | OXYGEN SATURATION: 97 % | WEIGHT: 233 LBS | SYSTOLIC BLOOD PRESSURE: 130 MMHG | BODY MASS INDEX: 38.82 KG/M2 | RESPIRATION RATE: 16 BRPM

## 2022-06-04 DIAGNOSIS — R68.89 FLU-LIKE SYMPTOMS: ICD-10-CM

## 2022-06-04 DIAGNOSIS — R50.9 FEVER, UNSPECIFIED FEVER CAUSE: ICD-10-CM

## 2022-06-04 DIAGNOSIS — J06.9 VIRAL URI WITH COUGH: ICD-10-CM

## 2022-06-04 DIAGNOSIS — R09.81 NASAL CONGESTION: ICD-10-CM

## 2022-06-04 LAB
EXTERNAL QUALITY CONTROL: NORMAL
INT CON NEG: NORMAL
INT CON POS: NORMAL
S PYO AG THROAT QL: NEGATIVE
SARS-COV+SARS-COV-2 AG RESP QL IA.RAPID: NEGATIVE

## 2022-06-04 PROCEDURE — 99214 OFFICE O/P EST MOD 30 MIN: CPT | Performed by: NURSE PRACTITIONER

## 2022-06-04 PROCEDURE — 87880 STREP A ASSAY W/OPTIC: CPT | Performed by: NURSE PRACTITIONER

## 2022-06-04 PROCEDURE — 87426 SARSCOV CORONAVIRUS AG IA: CPT | Performed by: NURSE PRACTITIONER

## 2022-06-04 RX ORDER — OSELTAMIVIR PHOSPHATE 75 MG/1
75 CAPSULE ORAL 2 TIMES DAILY
Qty: 10 CAPSULE | Refills: 0 | Status: SHIPPED | OUTPATIENT
Start: 2022-06-04 | End: 2022-06-09

## 2022-06-04 RX ORDER — OSELTAMIVIR PHOSPHATE 75 MG/1
75 CAPSULE ORAL 2 TIMES DAILY
Qty: 10 CAPSULE | Refills: 0 | Status: SHIPPED | OUTPATIENT
Start: 2022-06-04 | End: 2022-06-04 | Stop reason: SDUPTHER

## 2022-06-04 RX ORDER — NAPROXEN 500 MG/1
500 TABLET ORAL EVERY 12 HOURS PRN
Qty: 30 TABLET | Refills: 0 | Status: SHIPPED | OUTPATIENT
Start: 2022-06-04 | End: 2022-06-04 | Stop reason: SDUPTHER

## 2022-06-04 RX ORDER — NAPROXEN 500 MG/1
500 TABLET ORAL EVERY 12 HOURS PRN
Qty: 30 TABLET | Refills: 0 | Status: SHIPPED | OUTPATIENT
Start: 2022-06-04

## 2022-06-04 RX ORDER — DEXTROMETHORPHAN HYDROBROMIDE AND PROMETHAZINE HYDROCHLORIDE 15; 6.25 MG/5ML; MG/5ML
5 SYRUP ORAL EVERY 4 HOURS PRN
Qty: 120 ML | Refills: 0 | Status: SHIPPED | OUTPATIENT
Start: 2022-06-04 | End: 2023-05-11

## 2022-06-04 RX ORDER — BENZONATATE 200 MG/1
200 CAPSULE ORAL EVERY 8 HOURS PRN
Qty: 30 CAPSULE | Refills: 0 | Status: SHIPPED | OUTPATIENT
Start: 2022-06-04 | End: 2022-06-04

## 2022-06-04 ASSESSMENT — ENCOUNTER SYMPTOMS
COUGH: 1
SHORTNESS OF BREATH: 0
FEVER: 1
SORE THROAT: 1
MYALGIAS: 1

## 2022-06-04 ASSESSMENT — VISUAL ACUITY: OU: 1

## 2022-06-04 NOTE — LETTER
June 4, 2022         Patient: Cornelio Phipps   YOB: 1985   Date of Visit: 6/4/2022           To Whom it May Concern:    Cornelio Phipps was seen in my clinic on 6/4/2022 due to illness. Due to medical necessity, please excuse patient from work for up to the next 3 days as needed.     If you have any questions or concerns, please don't hesitate to call.        Sincerely,         MARIA ANTONIA Contreras.  Electronically Signed

## 2022-06-05 NOTE — PROGRESS NOTES
Subjective:     Cornelio Phipps is a 37 y.o. male who presents for Cough (Pt has body aches, congestion, runny nose, cough, fatigue x yesterday )       Cough  This is a new problem. The current episode started yesterday. The problem has been gradually worsening. Associated symptoms include a fever, myalgias and a sore throat. Pertinent negatives include no shortness of breath. Treatments tried: Ibuprofen. The treatment provided no relief.     Greenlandic translation provided by professional video conferencing service.     Denies exposure or travel.    Patient was screened prior to rooming and denied COVID-19 diagnosis or contact with a person who has been diagnosed or is suspected to have COVID-19. During this visit, appropriate PPE was worn, hand hygiene was performed, and the patient and any visitors were masked.     PMH:  has no past medical history on file.    MEDS:   Current Outpatient Medications:   •  promethazine-dextromethorphan (PROMETHAZINE-DM) 6.25-15 MG/5ML syrup, Take 5 mL by mouth every four hours as needed for Cough (Runny nose)., Disp: 120 mL, Rfl: 0  •  naproxen (NAPROSYN) 500 MG Tab, Take 1 Tablet by mouth every 12 hours as needed (Pain/inflammation/fever)., Disp: 30 Tablet, Rfl: 0  •  oseltamivir (TAMIFLU) 75 MG Cap, Take 1 Capsule by mouth 2 times a day for 5 days., Disp: 10 Capsule, Rfl: 0  •  hydrOXYzine pamoate (VISTARIL) 25 MG Cap, TAKE 1 CAPSULE ORALLY AS NEEDED FOR ANXIETY EVERY 8 HOURS (Patient not taking: Reported on 6/4/2022), Disp: , Rfl:     ALLERGIES:   Allergies   Allergen Reactions   • Nkda [No Known Drug Allergy]      SURGHX: History reviewed. No pertinent surgical history.    SOCHX:  reports that he has never smoked. He has never used smokeless tobacco. He reports previous alcohol use. He reports that he does not use drugs.     FH: Reviewed with patient, not pertinent to this visit.    Review of Systems   Constitutional: Positive for fever and malaise/fatigue.   HENT: Positive for  "congestion and sore throat.    Respiratory: Positive for cough. Negative for shortness of breath.    Musculoskeletal: Positive for myalgias.   All other systems reviewed and are negative.    Additional details per HPI.      Objective:     /80 (BP Location: Left arm, Patient Position: Sitting, BP Cuff Size: Large adult)   Pulse 82   Temp 36.7 °C (98.1 °F) (Temporal)   Resp 16   Ht 1.651 m (5' 5\")   Wt 106 kg (233 lb)   SpO2 97%   BMI 38.77 kg/m²     Physical Exam  Vitals reviewed.   Constitutional:       General: He is not in acute distress.     Appearance: He is well-developed. He is not ill-appearing or toxic-appearing.   HENT:      Head: Normocephalic.      Nose: Nose normal.      Mouth/Throat:      Pharynx: Posterior oropharyngeal erythema present.   Eyes:      General: Vision grossly intact.      Extraocular Movements: Extraocular movements intact.   Cardiovascular:      Rate and Rhythm: Normal rate and regular rhythm.      Heart sounds: Normal heart sounds.   Pulmonary:      Effort: Pulmonary effort is normal. No respiratory distress.      Breath sounds: Normal breath sounds. No decreased breath sounds, wheezing, rhonchi or rales.   Musculoskeletal:         General: No deformity. Normal range of motion.      Cervical back: Normal range of motion.   Skin:     General: Skin is warm and dry.      Coloration: Skin is not pale.   Neurological:      Mental Status: He is alert and oriented to person, place, and time.      Sensory: No sensory deficit.      Motor: No weakness.   Psychiatric:         Behavior: Behavior normal. Behavior is cooperative.     POCT Covid: negative    Rapid Strep A swab: negative      Assessment/Plan:     1. Viral URI with cough  - promethazine-dextromethorphan (PROMETHAZINE-DM) 6.25-15 MG/5ML syrup; Take 5 mL by mouth every four hours as needed for Cough (Runny nose).  Dispense: 120 mL; Refill: 0    2. Flu-like symptoms  - oseltamivir (TAMIFLU) 75 MG Cap; Take 1 Capsule by mouth " 2 times a day for 5 days.  Dispense: 10 Capsule; Refill: 0    3. Fever, unspecified fever cause  - POCT Rapid Strep A  - POCT SARS-COV Antigen [facility] Manual Result  - naproxen (NAPROSYN) 500 MG Tab; Take 1 Tablet by mouth every 12 hours as needed (Pain/inflammation/fever).  Dispense: 30 Tablet; Refill: 0    4. Nasal congestion  - promethazine-dextromethorphan (PROMETHAZINE-DM) 6.25-15 MG/5ML syrup; Take 5 mL by mouth every four hours as needed for Cough (Runny nose).  Dispense: 120 mL; Refill: 0    Discussed likely self-limiting viral etiology and expected course and duration of illness. Vital signs stable, afebrile, no acute distress at this time.    Rx as above sent electronically. Flu swab unavailable.  High clinical suspicion for flu.  Treat empirically.    Differential diagnosis, natural history, supportive care, rest, fluids, over-the-counter symptom management per 's instructions, close monitoring, and indications for immediate follow-up discussed.     All questions answered. Patient agrees with the plan of care.    Discharge summary provided.    Work note provided.     Billing note: 30 minutes was allotted and spent for patient care and coordination of care (not reported separately) including preparing for the visit, obtaining/reviewing history with help of , performing an exam/evaluation, ordering Rx, developing a plan of care, counseling/educating the patient, developing/printing/going over the discharge summary with the patient, producing a work note, and documentation. Care specific to this encounter was summarized here. Please refer to the chart for additional details on the care provided.

## 2022-12-02 ENCOUNTER — OFFICE VISIT (OUTPATIENT)
Dept: URGENT CARE | Facility: CLINIC | Age: 37
End: 2022-12-02
Payer: COMMERCIAL

## 2022-12-02 ENCOUNTER — HOSPITAL ENCOUNTER (OUTPATIENT)
Facility: MEDICAL CENTER | Age: 37
End: 2022-12-02
Attending: STUDENT IN AN ORGANIZED HEALTH CARE EDUCATION/TRAINING PROGRAM
Payer: COMMERCIAL

## 2022-12-02 VITALS
HEIGHT: 65 IN | HEART RATE: 74 BPM | TEMPERATURE: 97.4 F | SYSTOLIC BLOOD PRESSURE: 118 MMHG | RESPIRATION RATE: 14 BRPM | OXYGEN SATURATION: 97 % | BODY MASS INDEX: 39.1 KG/M2 | WEIGHT: 234.7 LBS | DIASTOLIC BLOOD PRESSURE: 80 MMHG

## 2022-12-02 DIAGNOSIS — N30.01 ACUTE CYSTITIS WITH HEMATURIA: ICD-10-CM

## 2022-12-02 LAB
APPEARANCE UR: NORMAL
BILIRUB UR STRIP-MCNC: NEGATIVE MG/DL
COLOR UR AUTO: NORMAL
GLUCOSE UR STRIP.AUTO-MCNC: NEGATIVE MG/DL
KETONES UR STRIP.AUTO-MCNC: NORMAL MG/DL
LEUKOCYTE ESTERASE UR QL STRIP.AUTO: NORMAL
NITRITE UR QL STRIP.AUTO: NEGATIVE
PH UR STRIP.AUTO: 7 [PH] (ref 5–8)
PROT UR QL STRIP: NEGATIVE MG/DL
RBC UR QL AUTO: NORMAL
SP GR UR STRIP.AUTO: 1.02
UROBILINOGEN UR STRIP-MCNC: 1 MG/DL

## 2022-12-02 PROCEDURE — 99214 OFFICE O/P EST MOD 30 MIN: CPT | Performed by: STUDENT IN AN ORGANIZED HEALTH CARE EDUCATION/TRAINING PROGRAM

## 2022-12-02 PROCEDURE — 87086 URINE CULTURE/COLONY COUNT: CPT

## 2022-12-02 PROCEDURE — 81002 URINALYSIS NONAUTO W/O SCOPE: CPT | Performed by: STUDENT IN AN ORGANIZED HEALTH CARE EDUCATION/TRAINING PROGRAM

## 2022-12-02 RX ORDER — SULFAMETHOXAZOLE AND TRIMETHOPRIM 800; 160 MG/1; MG/1
1 TABLET ORAL EVERY 12 HOURS
Qty: 14 TABLET | Refills: 0 | Status: SHIPPED | OUTPATIENT
Start: 2022-12-02 | End: 2022-12-09

## 2022-12-02 ASSESSMENT — ENCOUNTER SYMPTOMS
VOMITING: 0
DIARRHEA: 0
SHORTNESS OF BREATH: 1
HEADACHES: 0
FEVER: 0
DIZZINESS: 1
WHEEZING: 0
ABDOMINAL PAIN: 0
PALPITATIONS: 0
COUGH: 0
CHILLS: 0
CONSTIPATION: 0
SWEATS: 1
FLANK PAIN: 1
NAUSEA: 0

## 2022-12-03 NOTE — PROGRESS NOTES
"Subjective     Cornelio Phipps is a 37 y.o. male who presents with Headache and Shortness of Breath (X 1 days, SoB, dizziness, genital bleeding, pain with urination )            Cornelio is a 37-year-old male who presents today with symptoms of painful urination and blood in urine.  Patient noticed it starting today.  He also endorses flank pain.  Patient states that he noticed blood in his urine he has had episode of dizziness and shortness of breath.  Those symptoms have since resolved.  Patient denies fever/chills, abdominal pain.    Dysuria   This is a new problem. The current episode started in the past 7 days. The problem has been unchanged. The quality of the pain is described as burning. The pain is mild. There has been no fever. Associated symptoms include flank pain, hematuria and sweats. Pertinent negatives include no chills, discharge, frequency, hesitancy, nausea, urgency or vomiting.     Review of Systems   Constitutional:  Negative for chills, fever and malaise/fatigue.   Respiratory:  Positive for shortness of breath. Negative for cough and wheezing.    Cardiovascular:  Negative for chest pain and palpitations.   Gastrointestinal:  Negative for abdominal pain, constipation, diarrhea, nausea and vomiting.   Genitourinary:  Positive for dysuria, flank pain and hematuria. Negative for frequency, hesitancy and urgency.   Neurological:  Positive for dizziness. Negative for headaches.   All other systems reviewed and are negative.           Objective     /80 (BP Location: Left arm, Patient Position: Sitting, BP Cuff Size: Large adult long)   Pulse 74   Temp 36.3 °C (97.4 °F) (Temporal)   Resp 14   Ht 1.651 m (5' 5\")   Wt 106 kg (234 lb 11.2 oz)   SpO2 97%   BMI 39.06 kg/m²      Physical Exam  Vitals reviewed.   Constitutional:       General: He is not in acute distress.     Appearance: Normal appearance. He is not ill-appearing, toxic-appearing or diaphoretic.   HENT:      Head: Normocephalic and " atraumatic.   Eyes:      Extraocular Movements: Extraocular movements intact.      Conjunctiva/sclera: Conjunctivae normal.      Pupils: Pupils are equal, round, and reactive to light.   Cardiovascular:      Rate and Rhythm: Normal rate and regular rhythm.   Pulmonary:      Effort: Pulmonary effort is normal.      Breath sounds: Normal breath sounds.   Abdominal:      General: Abdomen is flat.      Palpations: Abdomen is soft.      Tenderness: There is no abdominal tenderness. There is no right CVA tenderness, left CVA tenderness, guarding or rebound.   Skin:     General: Skin is warm and dry.   Neurological:      General: No focal deficit present.      Mental Status: He is alert and oriented to person, place, and time. Mental status is at baseline.      GCS: GCS eye subscore is 4. GCS verbal subscore is 5. GCS motor subscore is 6.      Gait: Gait is intact.                           Assessment & Plan        1. Acute cystitis with hematuria  - POCT Urinalysis  - URINE CULTURE(NEW); Future  - sulfamethoxazole-trimethoprim (BACTRIM DS) 800-160 MG tablet; Take 1 Tablet by mouth every 12 hours for 7 days.  Dispense: 14 Tablet; Refill: 0     Symptoms of shortness of breath and dizziness have since resolved.  Patient states that symptoms were present for about 20 minutes after he noticed blood in his urine.  Patient is not currently experiencing symptoms.  Patient given strict ER precautions if he develops chest pain, shortness of breath, dizziness, it is recommended that he goes to ER for further evaluation/management. Patient advised to follow-up with primary care provider within the next week.    POCT Urinalysis with small leukocyte esterase and large blood.  URINE CULTURE collected and sent on to lab.  Patient will be notified of any positive results and adjustment in antibiotic therapy if indicated.  Patient to be started on Bactrim twice daily for 7 days.    Patient otherwise well-appearing in clinic.  Vital signs  are stable.  Physical exam within normal limits.      My total time spent caring for the patient on the day of the encounter was 30 minutes obtaining patient history, performing physical exam, reviewing differential diagnosis is, supportive care measures, discussing treatment plan.  was used for visit. This does not include time spent on separately billable procedures/tests.     Differential diagnoses, supportive care, and indications for immediate follow-up discussed with patient. Pathogenesis of diagnosis discussed including typical length and natural progression.      Instructed to return to urgent care or nearest emergency department if symptoms fail to improve, for any change in condition, further concerns, or new concerning symptoms.    Patient states understanding and agrees with the plan of care and discharge instructions.

## 2022-12-05 LAB
BACTERIA UR CULT: NORMAL
SIGNIFICANT IND 70042: NORMAL
SITE SITE: NORMAL
SOURCE SOURCE: NORMAL

## 2023-01-18 ENCOUNTER — HOSPITAL ENCOUNTER (OUTPATIENT)
Dept: RADIOLOGY | Facility: MEDICAL CENTER | Age: 38
End: 2023-01-18
Attending: PHYSICIAN ASSISTANT
Payer: COMMERCIAL

## 2023-01-18 DIAGNOSIS — R31.0 GROSS HEMATURIA: ICD-10-CM

## 2023-01-18 PROCEDURE — 74176 CT ABD & PELVIS W/O CONTRAST: CPT

## 2023-03-17 ENCOUNTER — HOSPITAL ENCOUNTER (OUTPATIENT)
Facility: MEDICAL CENTER | Age: 38
End: 2023-03-17
Attending: PHYSICIAN ASSISTANT
Payer: COMMERCIAL

## 2023-03-17 LAB — AMBIGUOUS DTTM AMBI4: NORMAL

## 2023-03-17 PROCEDURE — 87086 URINE CULTURE/COLONY COUNT: CPT

## 2023-03-20 LAB
BACTERIA UR CULT: NORMAL
SIGNIFICANT IND 70042: NORMAL
SITE SITE: NORMAL
SOURCE SOURCE: NORMAL

## 2023-05-08 ENCOUNTER — APPOINTMENT (OUTPATIENT)
Dept: ADMISSIONS | Facility: MEDICAL CENTER | Age: 38
End: 2023-05-08
Attending: STUDENT IN AN ORGANIZED HEALTH CARE EDUCATION/TRAINING PROGRAM
Payer: COMMERCIAL

## 2023-05-11 ENCOUNTER — PRE-ADMISSION TESTING (OUTPATIENT)
Dept: ADMISSIONS | Facility: MEDICAL CENTER | Age: 38
End: 2023-05-11
Attending: STUDENT IN AN ORGANIZED HEALTH CARE EDUCATION/TRAINING PROGRAM
Payer: COMMERCIAL

## 2023-05-11 VITALS — HEIGHT: 65 IN | BODY MASS INDEX: 39.06 KG/M2

## 2023-05-11 NOTE — PREPROCEDURE INSTRUCTIONS
Pre-admit telephone appointment completed. Pt states all instructions given are understood. Medications the patient will take the morning of surgery per anesthesia protocol:  None    Never had surgery

## 2023-05-12 ENCOUNTER — PRE-ADMISSION TESTING (OUTPATIENT)
Dept: ADMISSIONS | Facility: MEDICAL CENTER | Age: 38
End: 2023-05-12
Attending: STUDENT IN AN ORGANIZED HEALTH CARE EDUCATION/TRAINING PROGRAM
Payer: COMMERCIAL

## 2023-05-12 DIAGNOSIS — Z01.812 PRE-OPERATIVE LABORATORY EXAMINATION: ICD-10-CM

## 2023-05-12 LAB
ANION GAP SERPL CALC-SCNC: 8 MMOL/L (ref 7–16)
APPEARANCE UR: CLEAR
BILIRUB UR QL STRIP.AUTO: NEGATIVE
BUN SERPL-MCNC: 19 MG/DL (ref 8–22)
CALCIUM SERPL-MCNC: 9.6 MG/DL (ref 8.4–10.2)
CHLORIDE SERPL-SCNC: 104 MMOL/L (ref 96–112)
CO2 SERPL-SCNC: 29 MMOL/L (ref 20–33)
COLOR UR: YELLOW
CREAT SERPL-MCNC: 0.78 MG/DL (ref 0.5–1.4)
ERYTHROCYTE [DISTWIDTH] IN BLOOD BY AUTOMATED COUNT: 46.1 FL (ref 35.9–50)
GFR SERPLBLD CREATININE-BSD FMLA CKD-EPI: 118 ML/MIN/1.73 M 2
GLUCOSE SERPL-MCNC: 88 MG/DL (ref 65–99)
GLUCOSE UR STRIP.AUTO-MCNC: NEGATIVE MG/DL
HCT VFR BLD AUTO: 43.8 % (ref 42–52)
HGB BLD-MCNC: 14.3 G/DL (ref 14–18)
INR PPP: 0.99 (ref 0.87–1.13)
KETONES UR STRIP.AUTO-MCNC: NEGATIVE MG/DL
LEUKOCYTE ESTERASE UR QL STRIP.AUTO: NEGATIVE
MCH RBC QN AUTO: 28.4 PG (ref 27–33)
MCHC RBC AUTO-ENTMCNC: 32.6 G/DL (ref 33.7–35.3)
MCV RBC AUTO: 86.9 FL (ref 81.4–97.8)
MICRO URNS: NORMAL
NITRITE UR QL STRIP.AUTO: NEGATIVE
PH UR STRIP.AUTO: 6.5 [PH] (ref 5–8)
PLATELET # BLD AUTO: 248 K/UL (ref 164–446)
PMV BLD AUTO: 9.9 FL (ref 9–12.9)
POTASSIUM SERPL-SCNC: 4.3 MMOL/L (ref 3.6–5.5)
PROT UR QL STRIP: NEGATIVE MG/DL
PROTHROMBIN TIME: 13 SEC (ref 12–14.6)
RBC # BLD AUTO: 5.04 M/UL (ref 4.7–6.1)
RBC UR QL AUTO: NEGATIVE
SODIUM SERPL-SCNC: 141 MMOL/L (ref 135–145)
SP GR UR STRIP.AUTO: 1.02
WBC # BLD AUTO: 6.7 K/UL (ref 4.8–10.8)

## 2023-05-12 PROCEDURE — 87086 URINE CULTURE/COLONY COUNT: CPT

## 2023-05-12 PROCEDURE — 36415 COLL VENOUS BLD VENIPUNCTURE: CPT

## 2023-05-12 PROCEDURE — 81003 URINALYSIS AUTO W/O SCOPE: CPT

## 2023-05-12 PROCEDURE — 80048 BASIC METABOLIC PNL TOTAL CA: CPT

## 2023-05-12 PROCEDURE — 85610 PROTHROMBIN TIME: CPT

## 2023-05-12 PROCEDURE — 85027 COMPLETE CBC AUTOMATED: CPT

## 2023-05-14 LAB
BACTERIA UR CULT: NORMAL
SIGNIFICANT IND 70042: NORMAL
SITE SITE: NORMAL
SOURCE SOURCE: NORMAL

## 2023-05-25 ENCOUNTER — ANESTHESIA EVENT (OUTPATIENT)
Dept: SURGERY | Facility: MEDICAL CENTER | Age: 38
End: 2023-05-25
Payer: COMMERCIAL

## 2023-05-26 ENCOUNTER — ANESTHESIA (OUTPATIENT)
Dept: SURGERY | Facility: MEDICAL CENTER | Age: 38
End: 2023-05-26
Payer: COMMERCIAL

## 2023-05-26 ENCOUNTER — APPOINTMENT (OUTPATIENT)
Dept: RADIOLOGY | Facility: MEDICAL CENTER | Age: 38
End: 2023-05-26
Attending: STUDENT IN AN ORGANIZED HEALTH CARE EDUCATION/TRAINING PROGRAM
Payer: COMMERCIAL

## 2023-05-26 ENCOUNTER — APPOINTMENT (OUTPATIENT)
Dept: RADIOLOGY | Facility: MEDICAL CENTER | Age: 38
End: 2023-05-26
Attending: EMERGENCY MEDICINE
Payer: COMMERCIAL

## 2023-05-26 ENCOUNTER — HOSPITAL ENCOUNTER (EMERGENCY)
Facility: MEDICAL CENTER | Age: 38
End: 2023-05-27
Attending: EMERGENCY MEDICINE
Payer: COMMERCIAL

## 2023-05-26 ENCOUNTER — HOSPITAL ENCOUNTER (OUTPATIENT)
Facility: MEDICAL CENTER | Age: 38
End: 2023-05-26
Attending: STUDENT IN AN ORGANIZED HEALTH CARE EDUCATION/TRAINING PROGRAM | Admitting: STUDENT IN AN ORGANIZED HEALTH CARE EDUCATION/TRAINING PROGRAM
Payer: COMMERCIAL

## 2023-05-26 VITALS
SYSTOLIC BLOOD PRESSURE: 149 MMHG | BODY MASS INDEX: 37.31 KG/M2 | DIASTOLIC BLOOD PRESSURE: 78 MMHG | TEMPERATURE: 97.5 F | OXYGEN SATURATION: 91 % | RESPIRATION RATE: 26 BRPM | HEART RATE: 77 BPM | WEIGHT: 224.21 LBS

## 2023-05-26 DIAGNOSIS — N35.912 STRICTURE OF BULBOUS URETHRA IN MALE, UNSPECIFIED STRICTURE TYPE: Primary | ICD-10-CM

## 2023-05-26 DIAGNOSIS — R07.9 CHEST PAIN, UNSPECIFIED TYPE: ICD-10-CM

## 2023-05-26 PROBLEM — N35.919 URETHRAL STRICTURE: Status: ACTIVE | Noted: 2023-05-26

## 2023-05-26 LAB
ALBUMIN SERPL BCP-MCNC: 4.2 G/DL (ref 3.2–4.9)
ALBUMIN/GLOB SERPL: 1.1 G/DL
ALP SERPL-CCNC: 93 U/L (ref 30–99)
ALT SERPL-CCNC: 24 U/L (ref 2–50)
ANION GAP SERPL CALC-SCNC: 13 MMOL/L (ref 7–16)
AST SERPL-CCNC: 23 U/L (ref 12–45)
BASOPHILS # BLD AUTO: 0.1 % (ref 0–1.8)
BASOPHILS # BLD: 0.02 K/UL (ref 0–0.12)
BILIRUB SERPL-MCNC: 0.6 MG/DL (ref 0.1–1.5)
BUN SERPL-MCNC: 19 MG/DL (ref 8–22)
CALCIUM ALBUM COR SERPL-MCNC: 9.1 MG/DL (ref 8.5–10.5)
CALCIUM SERPL-MCNC: 9.3 MG/DL (ref 8.4–10.2)
CHLORIDE SERPL-SCNC: 100 MMOL/L (ref 96–112)
CO2 SERPL-SCNC: 22 MMOL/L (ref 20–33)
CREAT SERPL-MCNC: 0.91 MG/DL (ref 0.5–1.4)
EKG IMPRESSION: NORMAL
EOSINOPHIL # BLD AUTO: 0 K/UL (ref 0–0.51)
EOSINOPHIL NFR BLD: 0 % (ref 0–6.9)
ERYTHROCYTE [DISTWIDTH] IN BLOOD BY AUTOMATED COUNT: 44.5 FL (ref 35.9–50)
GFR SERPLBLD CREATININE-BSD FMLA CKD-EPI: 111 ML/MIN/1.73 M 2
GLOBULIN SER CALC-MCNC: 3.8 G/DL (ref 1.9–3.5)
GLUCOSE BLD STRIP.AUTO-MCNC: 137 MG/DL (ref 65–99)
GLUCOSE SERPL-MCNC: 127 MG/DL (ref 65–99)
HCT VFR BLD AUTO: 45.8 % (ref 42–52)
HGB BLD-MCNC: 15.2 G/DL (ref 14–18)
IMM GRANULOCYTES # BLD AUTO: 0.07 K/UL (ref 0–0.11)
IMM GRANULOCYTES NFR BLD AUTO: 0.4 % (ref 0–0.9)
LYMPHOCYTES # BLD AUTO: 1.16 K/UL (ref 1–4.8)
LYMPHOCYTES NFR BLD: 7.2 % (ref 22–41)
MCH RBC QN AUTO: 28.6 PG (ref 27–33)
MCHC RBC AUTO-ENTMCNC: 33.2 G/DL (ref 32.3–36.5)
MCV RBC AUTO: 86.3 FL (ref 81.4–97.8)
MONOCYTES # BLD AUTO: 0.36 K/UL (ref 0–0.85)
MONOCYTES NFR BLD AUTO: 2.2 % (ref 0–13.4)
NEUTROPHILS # BLD AUTO: 14.58 K/UL (ref 1.82–7.42)
NEUTROPHILS NFR BLD: 90.1 % (ref 44–72)
NRBC # BLD AUTO: 0 K/UL
NRBC BLD-RTO: 0 /100 WBC (ref 0–0.2)
NT-PROBNP SERPL IA-MCNC: 14 PG/ML (ref 0–125)
PLATELET # BLD AUTO: 260 K/UL (ref 164–446)
PMV BLD AUTO: 9.9 FL (ref 9–12.9)
POTASSIUM SERPL-SCNC: 3.9 MMOL/L (ref 3.6–5.5)
PROT SERPL-MCNC: 8 G/DL (ref 6–8.2)
RBC # BLD AUTO: 5.31 M/UL (ref 4.7–6.1)
SODIUM SERPL-SCNC: 135 MMOL/L (ref 135–145)
TROPONIN T SERPL-MCNC: <6 NG/L (ref 6–19)
WBC # BLD AUTO: 16.2 K/UL (ref 4.8–10.8)

## 2023-05-26 PROCEDURE — 99284 EMERGENCY DEPT VISIT MOD MDM: CPT

## 2023-05-26 PROCEDURE — 71045 X-RAY EXAM CHEST 1 VIEW: CPT

## 2023-05-26 PROCEDURE — 160025 RECOVERY II MINUTES (STATS): Performed by: STUDENT IN AN ORGANIZED HEALTH CARE EDUCATION/TRAINING PROGRAM

## 2023-05-26 PROCEDURE — 700101 HCHG RX REV CODE 250: Performed by: ANESTHESIOLOGY

## 2023-05-26 PROCEDURE — 71275 CT ANGIOGRAPHY CHEST: CPT

## 2023-05-26 PROCEDURE — 160048 HCHG OR STATISTICAL LEVEL 1-5: Performed by: STUDENT IN AN ORGANIZED HEALTH CARE EDUCATION/TRAINING PROGRAM

## 2023-05-26 PROCEDURE — 00910 ANES TRANSURETHRAL PX NOS: CPT | Performed by: ANESTHESIOLOGY

## 2023-05-26 PROCEDURE — 160046 HCHG PACU - 1ST 60 MINS PHASE II: Performed by: STUDENT IN AN ORGANIZED HEALTH CARE EDUCATION/TRAINING PROGRAM

## 2023-05-26 PROCEDURE — 700111 HCHG RX REV CODE 636 W/ 250 OVERRIDE (IP): Performed by: ANESTHESIOLOGY

## 2023-05-26 PROCEDURE — 700102 HCHG RX REV CODE 250 W/ 637 OVERRIDE(OP): Performed by: ANESTHESIOLOGY

## 2023-05-26 PROCEDURE — 93005 ELECTROCARDIOGRAM TRACING: CPT | Performed by: EMERGENCY MEDICINE

## 2023-05-26 PROCEDURE — 700117 HCHG RX CONTRAST REV CODE 255: Performed by: EMERGENCY MEDICINE

## 2023-05-26 PROCEDURE — 80053 COMPREHEN METABOLIC PANEL: CPT

## 2023-05-26 PROCEDURE — 82962 GLUCOSE BLOOD TEST: CPT

## 2023-05-26 PROCEDURE — C1769 GUIDE WIRE: HCPCS | Performed by: STUDENT IN AN ORGANIZED HEALTH CARE EDUCATION/TRAINING PROGRAM

## 2023-05-26 PROCEDURE — 700102 HCHG RX REV CODE 250 W/ 637 OVERRIDE(OP): Performed by: EMERGENCY MEDICINE

## 2023-05-26 PROCEDURE — 160035 HCHG PACU - 1ST 60 MINS PHASE I: Performed by: STUDENT IN AN ORGANIZED HEALTH CARE EDUCATION/TRAINING PROGRAM

## 2023-05-26 PROCEDURE — A9270 NON-COVERED ITEM OR SERVICE: HCPCS | Performed by: ANESTHESIOLOGY

## 2023-05-26 PROCEDURE — 160002 HCHG RECOVERY MINUTES (STAT): Performed by: STUDENT IN AN ORGANIZED HEALTH CARE EDUCATION/TRAINING PROGRAM

## 2023-05-26 PROCEDURE — 160028 HCHG SURGERY MINUTES - 1ST 30 MINS LEVEL 3: Performed by: STUDENT IN AN ORGANIZED HEALTH CARE EDUCATION/TRAINING PROGRAM

## 2023-05-26 PROCEDURE — 36415 COLL VENOUS BLD VENIPUNCTURE: CPT

## 2023-05-26 PROCEDURE — A9270 NON-COVERED ITEM OR SERVICE: HCPCS | Performed by: EMERGENCY MEDICINE

## 2023-05-26 PROCEDURE — 160009 HCHG ANES TIME/MIN: Performed by: STUDENT IN AN ORGANIZED HEALTH CARE EDUCATION/TRAINING PROGRAM

## 2023-05-26 PROCEDURE — 160036 HCHG PACU - EA ADDL 30 MINS PHASE I: Performed by: STUDENT IN AN ORGANIZED HEALTH CARE EDUCATION/TRAINING PROGRAM

## 2023-05-26 PROCEDURE — 700105 HCHG RX REV CODE 258: Performed by: STUDENT IN AN ORGANIZED HEALTH CARE EDUCATION/TRAINING PROGRAM

## 2023-05-26 PROCEDURE — 84484 ASSAY OF TROPONIN QUANT: CPT

## 2023-05-26 PROCEDURE — 83880 ASSAY OF NATRIURETIC PEPTIDE: CPT

## 2023-05-26 PROCEDURE — 85025 COMPLETE CBC W/AUTO DIFF WBC: CPT

## 2023-05-26 RX ORDER — DIPHENHYDRAMINE HYDROCHLORIDE 50 MG/ML
12.5 INJECTION INTRAMUSCULAR; INTRAVENOUS
Status: DISCONTINUED | OUTPATIENT
Start: 2023-05-26 | End: 2023-05-26 | Stop reason: HOSPADM

## 2023-05-26 RX ORDER — HYDROMORPHONE HYDROCHLORIDE 1 MG/ML
0.4 INJECTION, SOLUTION INTRAMUSCULAR; INTRAVENOUS; SUBCUTANEOUS
Status: DISCONTINUED | OUTPATIENT
Start: 2023-05-26 | End: 2023-05-26 | Stop reason: HOSPADM

## 2023-05-26 RX ORDER — EPHEDRINE SULFATE 50 MG/ML
5 INJECTION, SOLUTION INTRAVENOUS
Status: DISCONTINUED | OUTPATIENT
Start: 2023-05-26 | End: 2023-05-26 | Stop reason: HOSPADM

## 2023-05-26 RX ORDER — MEPERIDINE HYDROCHLORIDE 25 MG/ML
12.5 INJECTION INTRAMUSCULAR; INTRAVENOUS; SUBCUTANEOUS
Status: DISCONTINUED | OUTPATIENT
Start: 2023-05-26 | End: 2023-05-26 | Stop reason: HOSPADM

## 2023-05-26 RX ORDER — HYDROMORPHONE HYDROCHLORIDE 1 MG/ML
0.2 INJECTION, SOLUTION INTRAMUSCULAR; INTRAVENOUS; SUBCUTANEOUS
Status: DISCONTINUED | OUTPATIENT
Start: 2023-05-26 | End: 2023-05-26 | Stop reason: HOSPADM

## 2023-05-26 RX ORDER — HYDROMORPHONE HYDROCHLORIDE 1 MG/ML
0.5 INJECTION, SOLUTION INTRAMUSCULAR; INTRAVENOUS; SUBCUTANEOUS
Status: DISCONTINUED | OUTPATIENT
Start: 2023-05-26 | End: 2023-05-26 | Stop reason: HOSPADM

## 2023-05-26 RX ORDER — ONDANSETRON 2 MG/ML
INJECTION INTRAMUSCULAR; INTRAVENOUS PRN
Status: DISCONTINUED | OUTPATIENT
Start: 2023-05-26 | End: 2023-05-26 | Stop reason: HOSPADM

## 2023-05-26 RX ORDER — OXYCODONE HCL 5 MG/5 ML
5 SOLUTION, ORAL ORAL
Status: COMPLETED | OUTPATIENT
Start: 2023-05-26 | End: 2023-05-26

## 2023-05-26 RX ORDER — PHENAZOPYRIDINE HYDROCHLORIDE 200 MG/1
200 TABLET, FILM COATED ORAL 3 TIMES DAILY PRN
Qty: 30 TABLET | Refills: 0 | Status: SHIPPED | OUTPATIENT
Start: 2023-05-26 | End: 2023-06-09

## 2023-05-26 RX ORDER — CEFAZOLIN SODIUM 1 G/3ML
INJECTION, POWDER, FOR SOLUTION INTRAMUSCULAR; INTRAVENOUS PRN
Status: DISCONTINUED | OUTPATIENT
Start: 2023-05-26 | End: 2023-05-26 | Stop reason: SURG

## 2023-05-26 RX ORDER — ONDANSETRON 2 MG/ML
4 INJECTION INTRAMUSCULAR; INTRAVENOUS
Status: DISCONTINUED | OUTPATIENT
Start: 2023-05-26 | End: 2023-05-26 | Stop reason: HOSPADM

## 2023-05-26 RX ORDER — HALOPERIDOL 5 MG/ML
1 INJECTION INTRAMUSCULAR
Status: DISCONTINUED | OUTPATIENT
Start: 2023-05-26 | End: 2023-05-26 | Stop reason: HOSPADM

## 2023-05-26 RX ORDER — LIDOCAINE HYDROCHLORIDE 20 MG/ML
INJECTION, SOLUTION EPIDURAL; INFILTRATION; INTRACAUDAL; PERINEURAL PRN
Status: DISCONTINUED | OUTPATIENT
Start: 2023-05-26 | End: 2023-05-26 | Stop reason: SURG

## 2023-05-26 RX ORDER — SODIUM CHLORIDE, SODIUM LACTATE, POTASSIUM CHLORIDE, CALCIUM CHLORIDE 600; 310; 30; 20 MG/100ML; MG/100ML; MG/100ML; MG/100ML
INJECTION, SOLUTION INTRAVENOUS CONTINUOUS
Status: DISCONTINUED | OUTPATIENT
Start: 2023-05-26 | End: 2023-05-26 | Stop reason: HOSPADM

## 2023-05-26 RX ORDER — ACETAMINOPHEN 325 MG/1
650 TABLET ORAL ONCE
Status: COMPLETED | OUTPATIENT
Start: 2023-05-26 | End: 2023-05-26

## 2023-05-26 RX ORDER — OXYCODONE HCL 5 MG/5 ML
10 SOLUTION, ORAL ORAL
Status: COMPLETED | OUTPATIENT
Start: 2023-05-26 | End: 2023-05-26

## 2023-05-26 RX ORDER — LIDOCAINE HYDROCHLORIDE 10 MG/ML
INJECTION, SOLUTION EPIDURAL; INFILTRATION; INTRACAUDAL; PERINEURAL
Status: DISCONTINUED
Start: 2023-05-26 | End: 2023-05-26 | Stop reason: HOSPADM

## 2023-05-26 RX ORDER — ACETAMINOPHEN 500 MG
500 TABLET ORAL EVERY 4 HOURS PRN
Qty: 30 TABLET | Refills: 0 | Status: SHIPPED | OUTPATIENT
Start: 2023-05-26

## 2023-05-26 RX ORDER — SODIUM CHLORIDE, SODIUM LACTATE, POTASSIUM CHLORIDE, CALCIUM CHLORIDE 600; 310; 30; 20 MG/100ML; MG/100ML; MG/100ML; MG/100ML
INJECTION, SOLUTION INTRAVENOUS CONTINUOUS
Status: ACTIVE | OUTPATIENT
Start: 2023-05-26 | End: 2023-05-26

## 2023-05-26 RX ORDER — HYDRALAZINE HYDROCHLORIDE 20 MG/ML
5 INJECTION INTRAMUSCULAR; INTRAVENOUS
Status: DISCONTINUED | OUTPATIENT
Start: 2023-05-26 | End: 2023-05-26 | Stop reason: HOSPADM

## 2023-05-26 RX ORDER — KETOROLAC TROMETHAMINE 10 MG/1
10 TABLET, FILM COATED ORAL EVERY 6 HOURS PRN
Qty: 20 TABLET | Refills: 0 | Status: SHIPPED | OUTPATIENT
Start: 2023-05-26 | End: 2023-05-31

## 2023-05-26 RX ORDER — LABETALOL HYDROCHLORIDE 5 MG/ML
5 INJECTION, SOLUTION INTRAVENOUS
Status: DISCONTINUED | OUTPATIENT
Start: 2023-05-26 | End: 2023-05-26 | Stop reason: HOSPADM

## 2023-05-26 RX ORDER — DEXAMETHASONE SODIUM PHOSPHATE 4 MG/ML
INJECTION, SOLUTION INTRA-ARTICULAR; INTRALESIONAL; INTRAMUSCULAR; INTRAVENOUS; SOFT TISSUE PRN
Status: DISCONTINUED | OUTPATIENT
Start: 2023-05-26 | End: 2023-05-26 | Stop reason: SURG

## 2023-05-26 RX ORDER — MIDAZOLAM HYDROCHLORIDE 1 MG/ML
1 INJECTION INTRAMUSCULAR; INTRAVENOUS
Status: DISCONTINUED | OUTPATIENT
Start: 2023-05-26 | End: 2023-05-26 | Stop reason: HOSPADM

## 2023-05-26 RX ADMIN — LIDOCAINE HYDROCHLORIDE 60 MG: 20 INJECTION, SOLUTION EPIDURAL; INFILTRATION; INTRACAUDAL at 15:15

## 2023-05-26 RX ADMIN — ALBUTEROL SULFATE 2.5 MG: 2.5 SOLUTION RESPIRATORY (INHALATION) at 18:41

## 2023-05-26 RX ADMIN — FENTANYL CITRATE 50 MCG: 50 INJECTION, SOLUTION INTRAMUSCULAR; INTRAVENOUS at 15:27

## 2023-05-26 RX ADMIN — FENTANYL CITRATE 50 MCG: 50 INJECTION, SOLUTION INTRAMUSCULAR; INTRAVENOUS at 16:15

## 2023-05-26 RX ADMIN — IOHEXOL 51 ML: 350 INJECTION, SOLUTION INTRAVENOUS at 22:31

## 2023-05-26 RX ADMIN — FENTANYL CITRATE 25 MCG: 50 INJECTION, SOLUTION INTRAMUSCULAR; INTRAVENOUS at 17:52

## 2023-05-26 RX ADMIN — FENTANYL CITRATE 50 MCG: 50 INJECTION, SOLUTION INTRAMUSCULAR; INTRAVENOUS at 15:18

## 2023-05-26 RX ADMIN — DEXAMETHASONE SODIUM PHOSPHATE 4 MG: 4 INJECTION INTRA-ARTICULAR; INTRALESIONAL; INTRAMUSCULAR; INTRAVENOUS; SOFT TISSUE at 15:15

## 2023-05-26 RX ADMIN — FENTANYL CITRATE 50 MCG: 50 INJECTION, SOLUTION INTRAMUSCULAR; INTRAVENOUS at 15:59

## 2023-05-26 RX ADMIN — OXYCODONE HYDROCHLORIDE 10 MG: 5 SOLUTION ORAL at 15:59

## 2023-05-26 RX ADMIN — FENTANYL CITRATE 25 MCG: 50 INJECTION, SOLUTION INTRAMUSCULAR; INTRAVENOUS at 17:46

## 2023-05-26 RX ADMIN — SODIUM CHLORIDE, POTASSIUM CHLORIDE, SODIUM LACTATE AND CALCIUM CHLORIDE: 600; 310; 30; 20 INJECTION, SOLUTION INTRAVENOUS at 15:12

## 2023-05-26 RX ADMIN — FENTANYL CITRATE 25 MCG: 50 INJECTION, SOLUTION INTRAMUSCULAR; INTRAVENOUS at 16:56

## 2023-05-26 RX ADMIN — PROPOFOL 200 MG: 10 INJECTION, EMULSION INTRAVENOUS at 15:15

## 2023-05-26 RX ADMIN — ACETAMINOPHEN 650 MG: 325 TABLET ORAL at 23:30

## 2023-05-26 RX ADMIN — CEFAZOLIN 2 G: 1 INJECTION, POWDER, FOR SOLUTION INTRAMUSCULAR; INTRAVENOUS at 15:15

## 2023-05-26 RX ADMIN — ONDANSETRON 4 MG: 2 INJECTION INTRAMUSCULAR; INTRAVENOUS at 15:30

## 2023-05-26 NOTE — ANESTHESIA TIME REPORT
Anesthesia Start and Stop Event Times     Date Time Event    5/26/2023 1502 Ready for Procedure     1512 Anesthesia Start     1536 Anesthesia Stop        Responsible Staff  05/26/23    Name Role Begin End    Kirsten Fitzpatrick M.D. Anesth 1512 1536        Overtime Reason:  no overtime (within assigned shift)    Comments:

## 2023-05-26 NOTE — DISCHARGE INSTRUCTIONS
Discharge instructions    Take tylenol, Toradol, pyridium for post-operative pain.  No heavy lifting or straining while catheter is in place.  May shower as normal starting tomorrow.  Plan to follow up as scheduled in 5 days for catheter removal.        Instrucciones Para [place]  (Home Care Instructions)    ACTIVIDAD: Descanse y tome todo con mucha calma las primeras 24 horas después de moncada cirugía.  Lu persona adulta responsable debe permanecer con usted yoko natalia periodo de tiempo.  Es normal sentirse sonoliento o sonolienta yoko esas primeras horas.  Le recomendamos que no bhaskar nada que requiera equilibrio, dash decisiones a mucha coordinación de moncada parte.    NO BHASKAR ESTO PURANTE LAS PRIMERAS 24 HORAS:   Manejar o conducir algún vehiculo, operar maquinarias o utilizar electrodomesticos.   Beber cerveza o algún otro tipo de bebida alcohólica.   Dash decisiones importantes o firmar documentos legales.    INSTRUCCIONES ESPECIALES: Take tylenol, Toradol, pyridium for post-operative pain.  No heavy lifting or straining while catheter is in place.  May shower as normal starting tomorrow.  Plan to follow up as scheduled in 5 days for catheter removal.    DIETA: Para evitar las nauseas, prosiga despacito con moncada dieta a medida que pueda ir tolerándola mejor, evite comidas muy condimentadas o grasosas yoko natalia primer día.  Vaya agregando comidas más substanciadas a moncada dieta a medida que asi lo indique moncada médica.  Los bebés pueden beber leche preparada o formula, ásl joycelyn también leche del seno de la madre a medida que vayan teniendo hambre.  SIGA AGREGANDO LIQUIDOS Y COMIDAS CON FIBRA PARA EVITAR ESTREÑIMIENTO.    MEDICAMENTOS/MEDICINAS:  Vuelva a dash nikos medicamentos diarios.  Lampeter los medicamentos que se le prescribe con un poco de comida.  Si no le prescribe ningún tipo de medicamento, entonces puede dash medicinas para el dolor que no contienen aspirina, si las necesita.  LAS MEDICINAS PARA EL DOLOR  PUEDEN ESTREÑIRLE MUCHO.  Loop un suavizante para el excremento o materia fecal (stool softener) o un laxativo joycelyn por ejemplo: senokot, pericolase, o leche de magnesia, si lo necesita.    La ultima sosis de medicina para el dolor fue administrada 3:59 PM.     Se debe hacer micah consulta medica con el doctor, Líame para hacer la samia.    Usted debe LIAMAR A MONCADA MEDICO si tiene los siguientes síntomas:   -   Micah fiebre más enrique de 101 grados Fahrenheit.   -   Un dolor incesante aún con los medicamentos, o nauseas y vómito persistente.   -   Un sangrado excesivo (catrachita que traspasa los vendajes o gasas) o algúln tipo de drenaje inesperado que proviene de la henda.     -   Un color valenzuela exagerado o hinchazón alrededor del área en donde se le hizo incisión o ginger, o un drenaje de pus o con olor michelle proveniente de la henda.   -    La inhabilidad de orinar o vaciar moncada vejiga en 8 horas.   -    Problemas con a respiración o silvina en el pecho.    Usted debe llamar al 911 si se presentan problemas con el dolor al respirar o el pecho.  Si no se puede ponnoer en comunicación con un medica o con el centro de cirugía, usted debe ir a la estación de emergencia (emergency room) más cercana o a un centro de atención de urgencia (urgent care center).  El teléfono del medico es: [phone]    LOS SÍNTOMAS DE UN LEVE RESFRIO SON MUY NORMALES.  ADEMÁS USTED PUEDE LLEGAR A SENTIR SILVINA GENERALES DE MÚSCULOS, IRRITACIÓN EN LA GARGANTA, SILVINA DE ABHILASH Y/O UN POCO DE NAUSEAS.    Sie tiene alguna pregunta, llame a moncada médico.  Si moncada médico no se encuentra disponible, por favor llame al Centro de Cirugía at [phone].  el Centro está abierto de Lunes a Viernes desde las 7:00 de la manana hasta las 5:00 de la noche.      Mi firma a continuación indica que he recibido y entiendco estas instrucciones acera de los cuidados en la casa (Home Care Instructions)    Usted recibirá micah encuesta en la correspondencia en las siguientes  semanas y le pedimos que por favor tome un momento para completar ebony encuesta y regresaría a hosotros.  Nuestro objetivó es brindarle un cuidado muy becerril y par lo tanto apreciamos nikos coméntanos.  Muchas rui por muna escogido el [facility].        ACTIVITY: Rest and take it easy for the first 24 hours.  A responsible adult is recommended to remain with you during that time.  It is normal to feel sleepy.  We encourage you to not do anything that requires balance, judgment or coordination.    MILD FLU-LIKE SYMPTOMS ARE NORMAL. YOU MAY EXPERIENCE GENERALIZED MUSCLE ACHES, THROAT IRRITATION, HEADACHE AND/OR SOME NAUSEA.    FOR 24 HOURS DO NOT:  Drive, operate machinery or run household appliances.  Drink beer or alcoholic beverages.   Make important decisions or sign legal documents.    SPECIAL INSTRUCTIONS:   Take tylenol, Toradol, pyridium for post-operative pain.  No heavy lifting or straining while catheter is in place.  May shower as normal starting tomorrow.  Plan to follow up as scheduled in 5 days for catheter removal.    DIET: To avoid nausea, slowly advance diet as tolerated, avoiding spicy or greasy foods for the first day.  Add more substantial food to your diet according to your physician's instructions.  Babies can be fed formula or breast milk as soon as they are hungry.  INCREASE FLUIDS AND FIBER TO AVOID CONSTIPATION.    FOLLOW-UP APPOINTMENT:  A follow-up appointment should be arranged with your doctor; call to schedule.    You should CALL YOUR PHYSICIAN if you develop:  Fever greater than 101 degrees F.  Pain not relieved by medication, or persistent nausea or vomiting.  Excessive bleeding (blood soaking through dressing) or unexpected drainage from the wound.  Extreme redness or swelling around the incision site, drainage of pus or foul smelling drainage.  Inability to urinate or empty your bladder within 8 hours.  Problems with breathing or chest  pain.    You should call 911 if you develop problems with breathing or chest pain.  If you are unable to contact your doctor or surgical center, you should go to the nearest emergency room or urgent care center.  Physician's telephone #: [phone]    If any questions arise, call your doctor.  If your doctor is not available, please feel free to call the Surgical Center at [phone].     A registered nurse may call you a few days after your surgery to see how you are doing after your procedure.    MEDICATIONS: Resume taking daily medication.  Take prescribed pain medication with food.  If no medication is prescribed, you may take non-aspirin pain medication if needed.  PAIN MEDICATION CAN BE VERY CONSTIPATING.  Take a stool softener or laxative such as senokot, pericolace, or milk of magnesia if needed.    Last pain medication given at 3:59 PM in the recovery room.    If your physician has prescribed pain medication that includes Acetaminophen (Tylenol), do not take additional Acetaminophen (Tylenol) while taking the prescribed medication.

## 2023-05-26 NOTE — ANESTHESIA POSTPROCEDURE EVALUATION
Patient: Cornelio Deluna    Procedure Summary     Date: 05/26/23 Room / Location:  OR  / SURGERY [facility]    Anesthesia Start: 1512 Anesthesia Stop: 1536    Procedures:       CYSTOURETHROSCOPY WITH DIRECT VISION INTERNAL URETHROTOMY (Penis)      URETHROTOMY, INTERNAL (Penis) Diagnosis: (urethral stricture)    Surgeons: Alfa Forrest M.D. Responsible Provider: Kirsten Fitzpatrick M.D.    Anesthesia Type: general ASA Status: 2          Final Anesthesia Type: general  Last vitals  BP   Blood Pressure: 134/74    Temp   36.9 °C (98.4 °F)    Pulse   (!) 51   Resp   14    SpO2   99 %      Anesthesia Post Evaluation    Patient location during evaluation: PACU  Patient participation: complete - patient participated  Level of consciousness: awake and alert    Airway patency: patent  Anesthetic complications: no  Cardiovascular status: hemodynamically stable  Respiratory status: acceptable  Hydration status: euvolemic    PONV: none          No notable events documented.     Nurse Pain Score: 7 (NPRS)

## 2023-05-26 NOTE — OP REPORT
Urology Operative Report    Date: 5/26/2023    Pre-operative diagnosis: Urethral stricture    Post-operative diagnosis: Urethral stricture    Procedures:  Cystoscopy, direct visual internal urethrotomy    Surgeon: Surgeon(s) and Role:     * Alfa Forrest M.D. - Primary    Anesthesia: General    EBL: Minimal    IV fluids: Per anesthesia.    Specimens: None    Complications: None    Drains: 20 Taiwanese Bedoya catheter    Disposition:  PACU.  Follow up in clinic in 5 days for voiding trial.    Findings:  Soft urethral stricture in the distal bulbar urethra.  Normal bladder.    Indications for procedure:    Cornelio Deluna is a 38 y.o. male who presented with hematuria and was found to have a urethral stricture on cystoscopy.  After lengthy discussion of risks and benefits, patient agreed to proceed with cystoscopy, direct visual internal urethrotomy.    Details of procedure:    Patient was seen in the preoperative area and informed consent was obtained.  They were transported to the operating room and underwent general anesthesia without complication.  Ancef 2 g was administered for antibiotic prophylaxis.  Timeout was completed.  Patient was positioned in dorsal lithotomy and then prepped and draped.    21 Taiwanese urethrotome was inserted into the bladder per urethra.  At the distal bulbar urethra we encountered an area of wispy tissue without a clear urethral lumen.  Using gentle pressure were able to bypass this film and entered the bulbar urethra.  We were then able to advance the scope without resistance into the bladder.  Cystoscopy was performed which was unremarkable.  Scope was gently withdrawn into the bulbar urethra where we identified a soft urethral stricture.  DVIU was performed using the cold knife, incising the stricture in the 4:00 and 8:00 positions.  Lumen was widely patent.  Sphincter appeared unharmed.  Scope was withdrawn and a 20 Taiwanese catheter was inserted into the bladder  without resistance.  10 cc was instilled into the balloon.  This concluded the procedure.  Patient was awoken from anesthesia and transported PACU for recovery.

## 2023-05-26 NOTE — ANESTHESIA PROCEDURE NOTES
Airway    Date/Time: 5/26/2023 3:17 PM    Performed by: Kirsten Fitzpatrick M.D.  Authorized by: Kirsten Fitzpatrick M.D.    Location:  OR  Urgency:  Elective  Indications for Airway Management:  Anesthesia      Spontaneous Ventilation: absent    Sedation Level:  Deep  Preoxygenated: Yes    Final Airway Type:  Supraglottic airway  Final Supraglottic Airway:  Standard LMA    SGA Size:  4  Number of Attempts at Approach:  1

## 2023-05-26 NOTE — H&P
[facility] [place] Consult/H&P Note    Primary Service: Urology  Attending: Alfa Forrest M.D.  Patient's Name/MRN: Cornelio Deluna, [id number]    Admit Date:5/26/2023  Today's Date: 5/26/2023   Length of stay:  LOS: 0 days   Room #: SMPERIPOOL/NONE      Reason for consult/chief complaint: presents for surgery: DVIU  ID/HPI: Cornelio Deluna is a 38 y.o. male patient who presents today for above case for urethral stricture.  They have had no changes in their health since last visit with us.       Past Medical History:   Past Medical History:   Diagnosis Date    Urinary bladder disorder         Past Surgical History:   History reviewed. No pertinent surgical history.     Family History:   History reviewed. No pertinent family history.      Social History:   Social History     Tobacco Use    Smoking status: Never    Smokeless tobacco: Never    Tobacco comments:     occassional   Vaping Use    Vaping Use: Never used   Substance Use Topics    Alcohol use: Not Currently     Comment: occassional    Drug use: No      Social History     Social History Narrative    Not on file        Allergies: he Nkda [no known drug allergy]    Medications:   Medications Prior to Admission   Medication Sig Dispense Refill Last Dose    Multiple Vitamin (MULTIVITAMIN ADULT PO) Take  by mouth every day.   5/11/2023    naproxen (NAPROSYN) 500 MG Tab Take 1 Tablet by mouth every 12 hours as needed (Pain/inflammation/fever). (Patient not taking: Reported on 12/2/2022) 30 Tablet 0 5/6/2023    hydrOXYzine pamoate (VISTARIL) 25 MG Cap TAKE 1 CAPSULE ORALLY AS NEEDED FOR ANXIETY EVERY 8 HOURS (Patient not taking: Reported on 6/4/2022)   year ago         Review of Systems  As per HPI     Physical Exam  VITAL SIGNS: BP (!) 138/97   Pulse (!) 54   Temp 36.5 °C (97.7 °F) (Temporal)   Resp 16   Wt 102 kg (224 lb 3.3 oz)   SpO2 96%   BMI 37.31 kg/m²   Gen: no acute distress  Card: regular rate  Pulm: breathing  comfortably       Assessment/Recommendation   38 y.o. male here today for DVIU.  After a full discussion of the alternatives risks and benefits of the procedure, the patient consented to proceeding with the planned procedure.     Consent for planned procedure obtained.  Plan for home post op       Alfa Forrest M.D.   [address] [id number]   [phone]

## 2023-05-26 NOTE — OR NURSING
1536 Pt arrived from OR, report received from anesthesiologist and RN. Pt sedated at this time. OPA in place, respirations spontaneous and unlabored. Bedoya catheter in place.     1549 OPA removed, pt responds to voice but dozes easily.     1559 Pt states pain 7/10, medicated per MAR.     1615 Pt states no change in pain, medicated per MAR.     1630 Pt resting easily, wakes to voice.     1655 Pt states pain 6/10, medicated per MAR.     1715 Pt resting, wakes to voice. Weaned to RA    1745 Pt states pain 6/10, medicated per MAR.     1752 Pt states no change in pain, medicated per MAR. Pt O2 saturations occasionally drop below 90% while on room air, requiring O2 to increase saturations.     1830 Pt O2 saturations dropping in 80's on RA. Pt states tightness in chest when taking a deep breath. Administered nebulizer treatment.     1900 Pt states relief of tightness after nebulizer treatment.     1915 Report to Elsa MOURA.

## 2023-05-26 NOTE — ANESTHESIA PREPROCEDURE EVALUATION
Case: [id number] Date/Time: 05/26/23 1615    Procedures:       CYSTOURETHROSCOPY WITH DIRECT VISION INTERNAL URETHROTOMY      URETHROTOMY, INTERNAL    Pre-op diagnosis: URETHRAL STRICTURE    Location:  OR 04 / SURGERY [facility]    Surgeons: Alfa Forrest M.D.          Relevant Problems   No relevant active problems       Physical Exam    Airway   Mallampati: II  TM distance: >3 FB  Neck ROM: full       Cardiovascular - normal exam  Rhythm: regular  Rate: normal  (-) murmur     Dental - normal exam           Pulmonary - normal exam  Breath sounds clear to auscultation     Abdominal    Neurological - normal exam                 Anesthesia Plan    ASA 2       Plan - general       Airway plan will be LMA          Induction: intravenous    Postoperative Plan: Postoperative administration of opioids is intended.    Pertinent diagnostic labs and testing reviewed    Informed Consent:    Anesthetic plan and risks discussed with patient.

## 2023-05-27 VITALS
RESPIRATION RATE: 20 BRPM | DIASTOLIC BLOOD PRESSURE: 70 MMHG | OXYGEN SATURATION: 96 % | BODY MASS INDEX: 36.65 KG/M2 | HEIGHT: 65 IN | HEART RATE: 83 BPM | SYSTOLIC BLOOD PRESSURE: 122 MMHG | WEIGHT: 220 LBS | TEMPERATURE: 98.2 F

## 2023-05-27 RX ORDER — SODIUM CHLORIDE 9 MG/ML
1000 INJECTION, SOLUTION INTRAVENOUS ONCE
Status: DISCONTINUED | OUTPATIENT
Start: 2023-05-27 | End: 2023-05-27

## 2023-05-27 NOTE — OR NURSING
1915: Report received from Arlen MOURA.    1930: Pt up in chair. Family at bedside. Pt placed on RA. Denies pain and nausea. Meets criteria to transfer to Stage 2    2015: DC instructions provided to family in Armenian and english written. Pt remains stable on RA. Denies pain. Catheter education provided as well as leg bag and cap. D/Anil to care of family.

## 2023-05-27 NOTE — ED NOTES
Discharged in stable condition, alert and oriented, ambulatory, accompanied by family.. follow up appointment instructed. Health education imparted. Instructed to come back once symptoms worsened. Pt verbalized understanding of the information given. Removed IV line and applied pressure.

## 2023-05-27 NOTE — ED TRIAGE NOTES
Patient to room 10 H due low blood pressure, pale, diaphoretic, and trouble breathing per triage RN.  Patient had a cystoscopy with a urinary catheter placed done by Dr Guerrero (Urologist)    MD with patient in the hallway.

## 2023-05-27 NOTE — ED NOTES
Pt received from 10H to 3A. Attached pt to monitor, with 02 at 3LPM via nasal cannula. Pt awake, pale looking noted, complaining of chest pain and shortness of breath after a procedure done here in the hospital around 3pm.

## 2023-05-27 NOTE — ED NOTES
Re-checked pt's vital signs; pt positioned to sitting position from lying down, pt felt not good, pale looking noted; BP dropped to 110/55. Repeated BP 94/55. Checked BP while standing 111/57, HR 48, 02 89% RZ. Pt placed back to bed. Provided food and drinks. Informed Dr. Coleman.

## 2023-05-27 NOTE — ED PROVIDER NOTES
ED Provider Note    CHIEF COMPLAINT  Chief Complaint   Patient presents with    Chest Pain    Shortness of Breath     Patient was discharge from the OR after a cystoscopy with a urinary catheter placed by the Urologist.  Patient was being escorted out of the ER lobby, patient report feeling short of breath.  Patient pale and diaphoretic with low blood pressure.        EXTERNAL RECORDS REVIEWED  Outpatient Notes      HPI/ROS  LIMITATION TO HISTORY   Select: Altered mental status / Confusion  OUTSIDE HISTORIAN(S):  Family      Cornelio Deulna is a 38 y.o. male who presents from [facility] after receiving a cystoscopy today by Dr. Forrest.  Apparently, he had been doing well post procedure and was about to be taken to his car for discharge when he suddenly got very faint and reported shortness of breath and got diaphoretic as well.    Patient is 38 years old without significant prior medical history.  No cardiopulmonary disease history.    Family noted that he was slightly hypoxic after the procedure and that delayed discharge by a few hours.      I reviewed op notes and no noted complications.  Had urethral stricture with south placement.      PAST MEDICAL HISTORY   has a past medical history of Urinary bladder disorder.    SURGICAL HISTORY  patient denies any surgical history    FAMILY HISTORY  No family history on file.    SOCIAL HISTORY  Social History     Tobacco Use    Smoking status: Never    Smokeless tobacco: Never    Tobacco comments:     occassional   Vaping Use    Vaping Use: Never used   Substance and Sexual Activity    Alcohol use: Not Currently     Comment: occassional    Drug use: No    Sexual activity: Not on file       CURRENT MEDICATIONS  Home Medications    **Home medications have not yet been reviewed for this encounter**         ALLERGIES  Allergies   Allergen Reactions    Nkda [No Known Drug Allergy]        PHYSICAL EXAM  VITAL SIGNS: /57   Pulse 74   Temp 36.1 °C (96.9  "°F) (Temporal) Comment: Simultaneous filing. User may not have seen previous data. Comment (Src): Simultaneous filing. User may not have seen previous data.  Resp 18   Ht 1.651 m (5' 5\")   Wt 99.8 kg (220 lb)   SpO2 93%   BMI 36.61 kg/m²    Constitutional: Lethargic, pallor, diaphoresis  HENT: No signs of trauma, Bilateral external ears normal, Nose normal.   Eyes: Pupils are equal and reactive, Conjunctiva normal, Non-icteric.   Neck: Normal range of motion, No tenderness, Supple, No stridor.   Cardiovascular: Regular rate and rhythm.   Thorax & Lungs: Normal breath sounds, No respiratory distress, No wheezing  Abdomen: Soft, No tenderness, No peritoneal signs, No masses. Bedoya in place.  Skin: Warm, Dry, No erythema, No rash.   Back: No bony tenderness, No CVA tenderness.   Extremities: Intact distal pulses, No edema, No tenderness, No cyanosis  Musculoskeletal: Good range of motion in all major joints. No major deformities noted.   Neurologic: Lethargic, No focal deficits noted.       DIAGNOSTIC STUDIES / PROCEDURES  EKG  I have independently interpreted this EKG  Results for orders placed or performed during the hospital encounter of 23   EKG   Result Value Ref Range    Report       [facility] Emergency Dept.    Test Date:  2023  Pt Name:    PAZ MCMILLAN     Department: [facility]  MRN:        [id number]                      [facility]  Gender:     Male                         Technician: [id number]  :        1985                   Requested By:ANA MARÍA BILLS  Order #:    [id number]                    Reading MD: ANA MARÍA BILLS MD    Measurements  Intervals                                Axis  Rate:       66                           P:          29  NV:         164                          QRS:        24  QRSD:       100                          T:          21  QT:         383  QTc:        402    Interpretive Statements  Sinus rhythm  Compared to ECG " 06/02/2014 12:44:36  Inferior Q waves no longer present  Q waves no longer present  Electronically Signed On 5- 23:12:09 PDT by ANA MARÍA BILLS MD         LABS  Labs Reviewed   CBC WITH DIFFERENTIAL - Abnormal; Notable for the following components:       Result Value    WBC 16.2 (*)     Neutrophils-Polys 90.10 (*)     Lymphocytes 7.20 (*)     Neutrophils (Absolute) 14.58 (*)     All other components within normal limits    Narrative:     Biotin intake of greater than 5 mg per day may interfere with  troponin levels, causing false low values.   COMP METABOLIC PANEL - Abnormal; Notable for the following components:    Glucose 127 (*)     Globulin 3.8 (*)     All other components within normal limits    Narrative:     Biotin intake of greater than 5 mg per day may interfere with  troponin levels, causing false low values.   POCT GLUCOSE DEVICE RESULTS - Abnormal; Notable for the following components:    POC Glucose, Blood 137 (*)     All other components within normal limits   TROPONIN    Narrative:     Biotin intake of greater than 5 mg per day may interfere with  troponin levels, causing false low values.   CORRECTED CALCIUM    Narrative:     Biotin intake of greater than 5 mg per day may interfere with  troponin levels, causing false low values.   ESTIMATED GFR    Narrative:     Biotin intake of greater than 5 mg per day may interfere with  troponin levels, causing false low values.   PROBRAIN NATRIURETIC PEPTIDE, NT         RADIOLOGY  I have independently interpreted the diagnostic imaging associated with this visit and am waiting the final reading from the radiologist.   My preliminary interpretation is as follows: No pulmonary infiltrate; mild cardiomegaly  Radiologist interpretation:   CT-CTA CHEST PULMONARY ARTERY W/ RECONS   Final Result         1.  No evidence of pulmonary embolism.   2.  Mild cardiomegaly.      Fleischner Society pulmonary nodule recommendations:   Not applicable      DX-CHEST-PORTABLE (1  VIEW)   Final Result      No acute cardiopulmonary abnormality.          COURSE & MEDICAL DECISION MAKING    ED Observation Status? No; Patient does not meet criteria for ED Observation.     INITIAL ASSESSMENT, COURSE AND PLAN  Care Narrative: 38 y.o. M presenting from discharge lounge following outpatient cystoscopy earlier today for urethral stricture.  He was about to be discharged after a prolonged stay in the discharge lounge with hypoxia post anesthesia.  Weaned from oxygen and as he was being wheeled to his car, he felt extremely faint and became diaphoretic with chest pain and shortness of breath.      Upon arrival to the ED, he is leghargic, diaphoretic pale.  No active bleeding or conjunctival pallor.  Pt was noted to be hypotensive and bradycardic prior to arrival.  Pt was put in trendelenburg and had normal heart rate and blood pressure.      CXR does not show pulmonary abnormality.  No hypoglycemia.  No anemia.  EKG showing NSR and no signs of ischemia.  Trop is normal.  Pt has some shortness of breath and hypoxia and enlarged cardiac silhouette on CXR.  BNP was normal.  No wheezing or rales.      Given hypoxia of uncertain etiology, CT pulmonary was ordered as well.  No evidence of PE or pulmonary pathology.      Upon re-evaluation, pt resting comfortably.  Tolerating oral intake.  Weaned from oxygen and stable on room air.  Appears stable for discharge.  I suspect patient had a vaso-vagal event or possibly a reaction to anesthesia from earlier today.  No acute infectious etiology.  No signs of active hemorrhage.  Hemodynamically stable.      Results were reviewed with patient and family member and they are stable for discharge from the hospital.  Overall workup is extremely reassuring.        HTN/IDDM FOLLOW UP:  The patient is referred to a primary physician for blood pressure management, diabetic screening, and for all other preventive health concerns      ADDITIONAL PROBLEM LIST  Urethral  stricture    DISPOSITION AND DISCUSSIONS  I have discussed management of the patient with the following physicians and ROB's:      Discussion of management with other Hospitals in [place] or appropriate source(s): None     Escalation of care considered, and ultimately not performed:acute inpatient care management, however at this time, the patient is most appropriate for outpatient management    Barriers to care at this time, including but not limited to:    .     Decision tools and prescription drugs considered including, but not limited to:  Heart score 0 .    FINAL DIAGNOSIS  1. Chest pain, unspecified type           Electronically signed by: Lenny Wilkerson M.D., 5/26/2023 8:49 PM

## 2023-05-27 NOTE — ED TRIAGE NOTES
"38 yr old male   Chief Complaint   Patient presents with    Chest Pain    Shortness of Breath     Patient was discharge from the OR after a cystoscopy with a urinary catheter placed by the Urologist.  Patient was being escorted out of the ER lobby, patient report feeling short of breath.  Patient pale and diaphoretic with low blood pressure.      BP (!) 83/62   Pulse 64   Temp 36.1 °C (96.9 °F) (Temporal) Comment: Simultaneous filing. User may not have seen previous data. Comment (Src): Simultaneous filing. User may not have seen previous data.  Resp (!) 24   Ht 1.651 m (5' 5\")   Wt 99.8 kg (220 lb)   SpO2 97%   BMI 36.61 kg/m²     "

## 2023-05-27 NOTE — ED NOTES
Pt rounding, pt verbalized still not feeling ok, if its ok to wait for 30 minutes more to make sure he's ok to go home. Re-checked v/s.Vitally stable.

## 2023-07-10 ENCOUNTER — HOSPITAL ENCOUNTER (OUTPATIENT)
Facility: MEDICAL CENTER | Age: 38
End: 2023-07-10
Attending: STUDENT IN AN ORGANIZED HEALTH CARE EDUCATION/TRAINING PROGRAM
Payer: COMMERCIAL

## 2023-07-18 ENCOUNTER — HOSPITAL ENCOUNTER (OUTPATIENT)
Facility: MEDICAL CENTER | Age: 38
End: 2023-07-18
Attending: STUDENT IN AN ORGANIZED HEALTH CARE EDUCATION/TRAINING PROGRAM
Payer: COMMERCIAL

## 2023-07-18 PROCEDURE — 87086 URINE CULTURE/COLONY COUNT: CPT

## 2023-07-21 LAB
BACTERIA UR CULT: NORMAL
SIGNIFICANT IND 70042: NORMAL
SITE SITE: NORMAL
SOURCE SOURCE: NORMAL

## 2024-06-25 ENCOUNTER — OFFICE VISIT (OUTPATIENT)
Dept: URGENT CARE | Facility: CLINIC | Age: 39
End: 2024-06-25
Payer: COMMERCIAL

## 2024-06-25 VITALS
WEIGHT: 227.7 LBS | BODY MASS INDEX: 38.87 KG/M2 | DIASTOLIC BLOOD PRESSURE: 88 MMHG | OXYGEN SATURATION: 94 % | HEIGHT: 64 IN | SYSTOLIC BLOOD PRESSURE: 132 MMHG | RESPIRATION RATE: 20 BRPM | TEMPERATURE: 98.1 F | HEART RATE: 82 BPM

## 2024-06-25 DIAGNOSIS — H10.33 ACUTE BACTERIAL CONJUNCTIVITIS OF BOTH EYES: ICD-10-CM

## 2024-06-25 PROCEDURE — 3079F DIAST BP 80-89 MM HG: CPT | Performed by: NURSE PRACTITIONER

## 2024-06-25 PROCEDURE — 99213 OFFICE O/P EST LOW 20 MIN: CPT | Performed by: NURSE PRACTITIONER

## 2024-06-25 PROCEDURE — 3075F SYST BP GE 130 - 139MM HG: CPT | Performed by: NURSE PRACTITIONER

## 2024-06-25 RX ORDER — POLYMYXIN B SULFATE AND TRIMETHOPRIM 1; 10000 MG/ML; [USP'U]/ML
1 SOLUTION OPHTHALMIC EVERY 4 HOURS
Qty: 10 ML | Refills: 0 | Status: SHIPPED | OUTPATIENT
Start: 2024-06-25

## 2024-06-25 ASSESSMENT — ENCOUNTER SYMPTOMS
FEVER: 0
EYE REDNESS: 1
CHILLS: 0
DOUBLE VISION: 0
BLURRED VISION: 1
NAUSEA: 0
COUGH: 0
EYE PAIN: 0
EYE DISCHARGE: 1

## 2024-06-25 ASSESSMENT — FIBROSIS 4 INDEX: FIB4 SCORE: 0.69

## 2024-06-25 ASSESSMENT — VISUAL ACUITY: OU: 1

## 2024-06-25 NOTE — PROGRESS NOTES
"Subjective:   Cornelio Deluna is a 39 y.o. male who presents for Conjunctivitis (X1 week, eye discharge, unable to see if the morning, eyes get watery.)      Conjunctivitis  This is a new problem. The current episode started in the past 7 days. The problem occurs constantly. The problem has been gradually worsening. Pertinent negatives include no chills, congestion, coughing, fever or nausea. Nothing aggravates the symptoms. He has tried nothing for the symptoms.       Review of Systems   Constitutional:  Negative for chills and fever.   HENT:  Negative for congestion.    Eyes:  Positive for blurred vision, discharge and redness. Negative for double vision and pain.   Respiratory:  Negative for cough.    Gastrointestinal:  Negative for nausea.       Medications:    polymixin-trimethoprim Soln    Allergies: Nkda [no known drug allergy]    Problem List: Cornelio Deluna does not have any pertinent problems on file.    Surgical History:  Past Surgical History:   Procedure Laterality Date    GA CYSTOURETHROSCOPY N/A 5/26/2023    Procedure: CYSTOURETHROSCOPY WITH DIRECT VISION INTERNAL URETHROTOMY;  Surgeon: Alfa Forrest M.D.;  Location: [facility] [place] [facility];  Service: Urology    URETHROTOMY INTERNAL N/A 5/26/2023    Procedure: URETHROTOMY, INTERNAL;  Surgeon: Alfa Forrest M.D.;  Location: [facility];  Service: Urology    OTHER      Cystoscopy       Past Social Hx: Cornelio Deluna  reports that he has never smoked. He has never used smokeless tobacco. He reports that he does not currently use alcohol. He reports that he does not use drugs.     Past Family Hx:  Cornelio Deluna family history is not on file.     Problem list, medications, and allergies reviewed by myself today in Epic.     Objective:     /88   Pulse 82   Temp 36.7 °C (98.1 °F) (Temporal)   Resp 20   Ht 1.626 m (5' 4\")   Wt 103 kg (227 lb 11.2 oz)   SpO2 " 94%   BMI 39.08 kg/m²     Physical Exam  Constitutional:       Appearance: Normal appearance. He is not ill-appearing or toxic-appearing.   HENT:      Head: Normocephalic.      Right Ear: External ear normal.      Left Ear: External ear normal.      Nose: Nose normal.      Mouth/Throat:      Lips: Pink.   Eyes:      General: Lids are normal. Vision grossly intact.         Right eye: Discharge present.         Left eye: Discharge present.     Conjunctiva/sclera:      Right eye: Right conjunctiva is injected.      Left eye: Left conjunctiva is injected.   Pulmonary:      Effort: Pulmonary effort is normal. No accessory muscle usage.   Musculoskeletal:      Cervical back: Full passive range of motion without pain.   Neurological:      Mental Status: He is alert and oriented to person, place, and time.   Psychiatric:         Mood and Affect: Mood normal.         Thought Content: Thought content normal.         Assessment/Plan:     Diagnosis and associated orders:     1. Acute bacterial conjunctivitis of both eyes  polymixin-trimethoprim (POLYTRIM) 47322-8.1 UNIT/ML-% Solution         Comments/MDM:     Warm compresses to affected eye(s) 3 times daily  Hand hygiene discussed  Wash all recently used linens and towels in hot water  Do not touch dropper to eye (s)  Differential diagnosis, natural history, supportive care, and indications for immediate follow-up discussed.              Please note that this dictation was created using voice recognition software. I have made a reasonable attempt to correct obvious errors, but I expect that there are errors of grammar and possibly content that I did not discover before finalizing the note.    This note was electronically signed by Yoel RHOADES

## 2024-10-29 ENCOUNTER — OFFICE VISIT (OUTPATIENT)
Dept: URGENT CARE | Facility: CLINIC | Age: 39
End: 2024-10-29
Payer: COMMERCIAL

## 2024-10-29 VITALS
SYSTOLIC BLOOD PRESSURE: 116 MMHG | RESPIRATION RATE: 18 BRPM | TEMPERATURE: 97.1 F | OXYGEN SATURATION: 98 % | HEART RATE: 61 BPM | WEIGHT: 228 LBS | HEIGHT: 64 IN | DIASTOLIC BLOOD PRESSURE: 68 MMHG | BODY MASS INDEX: 38.93 KG/M2

## 2024-10-29 DIAGNOSIS — B34.9 NONSPECIFIC SYNDROME SUGGESTIVE OF VIRAL ILLNESS: ICD-10-CM

## 2024-10-29 LAB
FLUAV RNA SPEC QL NAA+PROBE: NEGATIVE
FLUBV RNA SPEC QL NAA+PROBE: NEGATIVE
RSV RNA SPEC QL NAA+PROBE: NEGATIVE
SARS-COV-2 RNA RESP QL NAA+PROBE: NEGATIVE

## 2024-10-29 RX ORDER — FLUTICASONE PROPIONATE 50 MCG
1 SPRAY, SUSPENSION (ML) NASAL 2 TIMES DAILY
Qty: 16 G | Refills: 0 | Status: SHIPPED | OUTPATIENT
Start: 2024-10-29

## 2024-10-29 ASSESSMENT — ENCOUNTER SYMPTOMS
SORE THROAT: 0
COUGH: 1
SHORTNESS OF BREATH: 0
SINUS PRESSURE: 0
HEADACHES: 0
HOARSE VOICE: 1

## 2024-10-29 ASSESSMENT — FIBROSIS 4 INDEX: FIB4 SCORE: 0.69

## 2025-05-06 ENCOUNTER — OFFICE VISIT (OUTPATIENT)
Dept: URGENT CARE | Facility: CLINIC | Age: 40
End: 2025-05-06
Payer: COMMERCIAL

## 2025-05-06 VITALS
HEIGHT: 65 IN | HEART RATE: 77 BPM | OXYGEN SATURATION: 94 % | TEMPERATURE: 97.8 F | BODY MASS INDEX: 37.82 KG/M2 | WEIGHT: 227 LBS | DIASTOLIC BLOOD PRESSURE: 88 MMHG | SYSTOLIC BLOOD PRESSURE: 146 MMHG | RESPIRATION RATE: 20 BRPM

## 2025-05-06 DIAGNOSIS — J04.0 LARYNGITIS: ICD-10-CM

## 2025-05-06 PROCEDURE — 3079F DIAST BP 80-89 MM HG: CPT | Performed by: PHYSICIAN ASSISTANT

## 2025-05-06 PROCEDURE — 3077F SYST BP >= 140 MM HG: CPT | Performed by: PHYSICIAN ASSISTANT

## 2025-05-06 PROCEDURE — 99213 OFFICE O/P EST LOW 20 MIN: CPT | Performed by: PHYSICIAN ASSISTANT

## 2025-05-06 RX ORDER — METHYLPREDNISOLONE 4 MG/1
TABLET ORAL
Qty: 21 TABLET | Refills: 0 | Status: SHIPPED | OUTPATIENT
Start: 2025-05-06 | End: 2025-05-13

## 2025-05-06 ASSESSMENT — ENCOUNTER SYMPTOMS
ABDOMINAL PAIN: 0
FEVER: 0
COUGH: 1
PALPITATIONS: 0
SINUS PAIN: 0
TROUBLE SWALLOWING: 0
VOMITING: 0
CHILLS: 0
MYALGIAS: 0
HEADACHES: 0
WHEEZING: 0
SORE THROAT: 1
HOARSE VOICE: 1
NAUSEA: 0
SPUTUM PRODUCTION: 0
STRIDOR: 0
SWOLLEN GLANDS: 0
DIARRHEA: 0
SHORTNESS OF BREATH: 0

## 2025-05-06 ASSESSMENT — FIBROSIS 4 INDEX: FIB4 SCORE: 0.69

## 2025-05-06 NOTE — LETTER
May 6, 2025         Patient: Cornelio Deluna   YOB: 1985   Date of Visit: 5/6/2025           To Whom it May Concern:    Cornelio Deluna was seen in my clinic on 5/6/2025. He should be excused from work 5/6/25-5/7/25 due to medical reasons.    If you have any questions or concerns, please don't hesitate to call.        Sincerely,           Britt Terry P.A.-C.  Electronically Signed

## 2025-05-11 ENCOUNTER — APPOINTMENT (OUTPATIENT)
Dept: RADIOLOGY | Facility: MEDICAL CENTER | Age: 40
End: 2025-05-11
Attending: EMERGENCY MEDICINE
Payer: COMMERCIAL

## 2025-05-11 ENCOUNTER — HOSPITAL ENCOUNTER (EMERGENCY)
Facility: MEDICAL CENTER | Age: 40
End: 2025-05-11
Attending: EMERGENCY MEDICINE
Payer: COMMERCIAL

## 2025-05-11 VITALS
HEIGHT: 65 IN | DIASTOLIC BLOOD PRESSURE: 69 MMHG | OXYGEN SATURATION: 91 % | BODY MASS INDEX: 37.82 KG/M2 | WEIGHT: 227 LBS | RESPIRATION RATE: 18 BRPM | SYSTOLIC BLOOD PRESSURE: 128 MMHG | TEMPERATURE: 97.9 F | HEART RATE: 100 BPM

## 2025-05-11 DIAGNOSIS — R05.1 ACUTE COUGH: ICD-10-CM

## 2025-05-11 DIAGNOSIS — J40 BRONCHITIS: ICD-10-CM

## 2025-05-11 LAB
FLUAV RNA SPEC QL NAA+PROBE: NEGATIVE
FLUBV RNA SPEC QL NAA+PROBE: NEGATIVE
RSV RNA SPEC QL NAA+PROBE: NEGATIVE
SARS-COV-2 RNA RESP QL NAA+PROBE: NOTDETECTED

## 2025-05-11 PROCEDURE — 71045 X-RAY EXAM CHEST 1 VIEW: CPT

## 2025-05-11 PROCEDURE — A9270 NON-COVERED ITEM OR SERVICE: HCPCS | Performed by: EMERGENCY MEDICINE

## 2025-05-11 PROCEDURE — 0241U HCHG SARS-COV-2 COVID-19 NFCT DS RESP RNA 4 TRGT ED POC: CPT

## 2025-05-11 PROCEDURE — 99283 EMERGENCY DEPT VISIT LOW MDM: CPT

## 2025-05-11 PROCEDURE — 700102 HCHG RX REV CODE 250 W/ 637 OVERRIDE(OP): Performed by: EMERGENCY MEDICINE

## 2025-05-11 RX ORDER — ALBUTEROL SULFATE 90 UG/1
2 INHALANT RESPIRATORY (INHALATION) ONCE
Status: COMPLETED | OUTPATIENT
Start: 2025-05-11 | End: 2025-05-11

## 2025-05-11 RX ADMIN — ALBUTEROL SULFATE 2 PUFF: 90 AEROSOL, METERED RESPIRATORY (INHALATION) at 10:27

## 2025-05-11 ASSESSMENT — FIBROSIS 4 INDEX: FIB4 SCORE: 0.7

## 2025-05-11 NOTE — ED PROVIDER NOTES
"ED Provider Note    CHIEF COMPLAINT  Chief Complaint   Patient presents with    Cough    Shortness of Breath    Congestion       EXTERNAL RECORDS REVIEWED  Reviewed the urgent care note completed on 5/6/2025 for laryngitis viral etiology started on Medrol Dosepak.  Limitations: Hebrew therefore  was utilized patient's daughter  ODILIA/SAEED Deluna is a 40 y.o. male who presents complaining of shortness of breath, slight cough for the last several days.  Patient states he was seen at urgent care on 5/6/2025 for laryngitis was given a Medrol Dosepak.  Following this, he started having a cough, slight shortness of breath.  Denies fever, chest pain, nausea, vomiting, swelling of his lower extremities.  He states this is hard to catch his breath and feels he has an infection in his lungs.    PAST MEDICAL HISTORY   has a past medical history of Urinary bladder disorder.    SURGICAL HISTORY   has a past surgical history that includes other; cystourethroscopy (N/A, 5/26/2023); and urethrotomy internal (N/A, 5/26/2023).    FAMILY HISTORY  No family history on file.    SOCIAL HISTORY  Social History     Tobacco Use    Smoking status: Never    Smokeless tobacco: Never    Tobacco comments:     occassional   Vaping Use    Vaping status: Never Used   Substance and Sexual Activity    Alcohol use: Not Currently     Comment: occassional    Drug use: No    Sexual activity: Not on file       CURRENT MEDICATIONS  Home Medications    **Home medications have not yet been reviewed for this encounter**         ALLERGIES  Allergies   Allergen Reactions    Nkda [No Known Drug Allergy]        PHYSICAL EXAM  VITAL SIGNS: /69   Pulse 100   Temp 36.6 °C (97.9 °F) (Temporal)   Resp 18   Ht 1.651 m (5' 5\")   Wt 103 kg (227 lb)   SpO2 91% Comment: Ambulation trial saturation  BMI 37.77 kg/m²      Nursing notes and vitals reviewed.  Constitutional: Well developed, Well nourished, No acute distress, " Non-toxic appearance.   HENT: Normocephalic, Atraumatic, moist mucous membranes, no facial edema   Cardiovascular: Normal heart rate, Normal rhythm, No murmurs, No rubs, No gallops.   Thorax & Lungs: No respiratory distress, No rales, No rhonchi, No wheezing, No chest tenderness.   Skin: Warm, Dry, No erythema, No rash.   Extremities: No deformity, no pedal edema, good range of motion range of motion upper lower extremes bilaterally        EKG/LABS  Sinus rhythm on monitor, normal saturation of oxygen greater than 90 percentile  I have independently interpreted this EKG  Results for orders placed or performed during the hospital encounter of 05/11/25   POC CoV-2, FLU A/B, RSV by PCR    Collection Time: 05/11/25  8:13 AM   Result Value Ref Range    POC Influenza A RNA, PCR Negative Negative    POC Influenza B RNA, PCR Negative Negative    POC RSV, by PCR Negative Negative    POC SARS-CoV-2, PCR NotDetected NotDetected       RADIOLOGY/PROCEDURES   I have independently interpreted the diagnostic imaging associated with this visit and am waiting the final reading from the radiologist.   My preliminary interpretation is as follows: Chest x-ray is negative for infiltrate    Radiologist interpretation:  DX-CHEST-PORTABLE (1 VIEW)   Final Result         1. No acute cardiopulmonary abnormalities identified.                         COURSE & MEDICAL DECISION MAKING    ASSESSMENT, COURSE AND PLAN  Care Narrative: This is a pleasant 40-year-old gentleman with shortness of breath.  COVID is negative influenza and RSV are negative as well.  X-ray shows no evidence of infiltrate.  He has a viral condition.  He should albuterol here for diminished breath sounds.  I do not the patient requires antibiotics as a viral condition.  He did ambulate throughout the department was not hypoxic.  The patient was instructed to return to his primary care physician or hospital as needed and this could take several days or weeks to go away.        DISPOSITION AND DISCUSSIONS      Decision tools and prescription drugs considered including, but not limited to: Considered antibiotics the patient here has no evidence of bacterial infection requiring antibiotics.    FINAL DIAGNOSIS  1. Acute cough    2. Bronchitis        DISPOSITION:  Patient will be discharged home in stable condition.    FOLLOW UP:  [facility], Emergency Dept  [address] [id number] [phone]    If symptoms worsen    Emiliano Valdes P.A.-C.  [address] [place] [id number] [phone]    Schedule an appointment as soon as possible for a visit   As needed       Electronically signed by: Black Vásquez D.O., 5/11/2025 10:04 AM

## 2025-05-11 NOTE — ED NOTES
The patient has been provided with discharge education and information.  The patient was also provided with instructions on follow up care and return precautions.  The patient verbalizes understanding of discharge instructions, follow up care, and return precautions.  All questions have been answered.  No RX electronically prescribed to patient's preferred pharmacy.  NAD, A/Ox4, good color and appropriate at time of discharge.  Patient ambulated out of department.

## 2025-05-11 NOTE — ED NOTES
Ambulation trial performed with patient to test oxygen saturation during ambulation. Patient's saturation found to be 91% at the lowest point, with a heart rate of 100bpm at the peak. RN and ERP notified of results.

## 2025-05-11 NOTE — ED NOTES
To green 29 from triage. Review and agree with triage note. Changed to gown and up to be seen.   Lungs diminished throughout. No additional needs currently

## 2025-05-11 NOTE — DISCHARGE INSTRUCTIONS
Please follow-up with your primary care physician for further evaluation.  I believe you have a viral bronchitis that may take several days to go away.  Take ibuprofen and Tylenol as needed.  Utilize the albuterol you have supplied for you every 4 hours you may take 2 puffs at a time.  Return to the emerged part of your profound shortness of breath, cough or chest pain.

## 2025-05-11 NOTE — ED TRIAGE NOTES
Pt arrives to ED with complaints of congestion, shortness of breath, and chills at home. He started to have symptoms 6 days ago. He was seen at  and put on medrol dose paula which did help his sore throat but he still is having symptoms. His primary concern is shortness of breath.     Pt educated on ED process and asked to wait in lobby. Patient educated on importance of alerting staff to new or worsening symptoms or concerns.

## 2025-05-13 ENCOUNTER — APPOINTMENT (OUTPATIENT)
Dept: RADIOLOGY | Facility: MEDICAL CENTER | Age: 40
DRG: 189 | End: 2025-05-13
Attending: EMERGENCY MEDICINE
Payer: COMMERCIAL

## 2025-05-13 ENCOUNTER — HOSPITAL ENCOUNTER (INPATIENT)
Facility: MEDICAL CENTER | Age: 40
LOS: 6 days | DRG: 189 | End: 2025-05-19
Attending: EMERGENCY MEDICINE | Admitting: INTERNAL MEDICINE
Payer: COMMERCIAL

## 2025-05-13 DIAGNOSIS — J96.21 ACUTE ON CHRONIC RESPIRATORY FAILURE WITH HYPOXEMIA (HCC): ICD-10-CM

## 2025-05-13 DIAGNOSIS — R06.03 RESPIRATORY DISTRESS: ICD-10-CM

## 2025-05-13 DIAGNOSIS — J98.01 BRONCHOSPASM: ICD-10-CM

## 2025-05-13 DIAGNOSIS — J96.01 ACUTE RESPIRATORY FAILURE WITH HYPOXIA (HCC): Primary | ICD-10-CM

## 2025-05-13 PROBLEM — J96.90 RESPIRATORY FAILURE (HCC): Status: ACTIVE | Noted: 2025-05-13

## 2025-05-13 PROBLEM — I16.0 HYPERTENSIVE URGENCY: Status: ACTIVE | Noted: 2025-05-13

## 2025-05-13 LAB
ALBUMIN SERPL BCP-MCNC: 3.8 G/DL (ref 3.2–4.9)
ALBUMIN/GLOB SERPL: 0.8 G/DL
ALP SERPL-CCNC: 104 U/L (ref 30–99)
ALT SERPL-CCNC: 36 U/L (ref 2–50)
ANION GAP SERPL CALC-SCNC: 12 MMOL/L (ref 7–16)
AST SERPL-CCNC: 30 U/L (ref 12–45)
BASOPHILS # BLD AUTO: 0.3 % (ref 0–1.8)
BASOPHILS # BLD: 0.05 K/UL (ref 0–0.12)
BILIRUB SERPL-MCNC: 0.4 MG/DL (ref 0.1–1.5)
BUN SERPL-MCNC: 17 MG/DL (ref 8–22)
CALCIUM ALBUM COR SERPL-MCNC: 9.3 MG/DL (ref 8.5–10.5)
CALCIUM SERPL-MCNC: 9.1 MG/DL (ref 8.5–10.5)
CHLORIDE SERPL-SCNC: 103 MMOL/L (ref 96–112)
CO2 SERPL-SCNC: 24 MMOL/L (ref 20–33)
CREAT SERPL-MCNC: 0.78 MG/DL (ref 0.5–1.4)
EKG IMPRESSION: NORMAL
EKG IMPRESSION: NORMAL
EOSINOPHIL # BLD AUTO: 0.06 K/UL (ref 0–0.51)
EOSINOPHIL NFR BLD: 0.4 % (ref 0–6.9)
ERYTHROCYTE [DISTWIDTH] IN BLOOD BY AUTOMATED COUNT: 46.7 FL (ref 35.9–50)
ERYTHROCYTE [SEDIMENTATION RATE] IN BLOOD BY WESTERGREN METHOD: 24 MM/HOUR (ref 0–20)
FLUAV RNA SPEC QL NAA+PROBE: NEGATIVE
FLUBV RNA SPEC QL NAA+PROBE: NEGATIVE
GFR SERPLBLD CREATININE-BSD FMLA CKD-EPI: 116 ML/MIN/1.73 M 2
GLOBULIN SER CALC-MCNC: 4.5 G/DL (ref 1.9–3.5)
GLUCOSE SERPL-MCNC: 118 MG/DL (ref 65–99)
HCT VFR BLD AUTO: 45.3 % (ref 42–52)
HGB BLD-MCNC: 14.6 G/DL (ref 14–18)
IMM GRANULOCYTES # BLD AUTO: 0.07 K/UL (ref 0–0.11)
IMM GRANULOCYTES NFR BLD AUTO: 0.5 % (ref 0–0.9)
LIPASE SERPL-CCNC: 22 U/L (ref 11–82)
LYMPHOCYTES # BLD AUTO: 1.95 K/UL (ref 1–4.8)
LYMPHOCYTES NFR BLD: 12.9 % (ref 22–41)
MAGNESIUM SERPL-MCNC: 2.3 MG/DL (ref 1.5–2.5)
MCH RBC QN AUTO: 28.5 PG (ref 27–33)
MCHC RBC AUTO-ENTMCNC: 32.2 G/DL (ref 32.3–36.5)
MCV RBC AUTO: 88.3 FL (ref 81.4–97.8)
MONOCYTES # BLD AUTO: 1.03 K/UL (ref 0–0.85)
MONOCYTES NFR BLD AUTO: 6.8 % (ref 0–13.4)
NEUTROPHILS # BLD AUTO: 12.01 K/UL (ref 1.82–7.42)
NEUTROPHILS NFR BLD: 79.1 % (ref 44–72)
NRBC # BLD AUTO: 0 K/UL
NRBC BLD-RTO: 0 /100 WBC (ref 0–0.2)
NT-PROBNP SERPL IA-MCNC: <36 PG/ML (ref 0–125)
PLATELET # BLD AUTO: 292 K/UL (ref 164–446)
PMV BLD AUTO: 9.3 FL (ref 9–12.9)
POTASSIUM SERPL-SCNC: 3.9 MMOL/L (ref 3.6–5.5)
PROCALCITONIN SERPL-MCNC: 0.1 NG/ML
PROT SERPL-MCNC: 8.3 G/DL (ref 6–8.2)
RBC # BLD AUTO: 5.13 M/UL (ref 4.7–6.1)
RSV RNA SPEC QL NAA+PROBE: NEGATIVE
SARS-COV-2 RNA RESP QL NAA+PROBE: NOTDETECTED
SODIUM SERPL-SCNC: 139 MMOL/L (ref 135–145)
TROPONIN T SERPL-MCNC: 7 NG/L (ref 6–19)
WBC # BLD AUTO: 15.2 K/UL (ref 4.8–10.8)

## 2025-05-13 PROCEDURE — 700111 HCHG RX REV CODE 636 W/ 250 OVERRIDE (IP): Mod: JZ | Performed by: INTERNAL MEDICINE

## 2025-05-13 PROCEDURE — 85652 RBC SED RATE AUTOMATED: CPT

## 2025-05-13 PROCEDURE — 83880 ASSAY OF NATRIURETIC PEPTIDE: CPT

## 2025-05-13 PROCEDURE — 93005 ELECTROCARDIOGRAM TRACING: CPT | Mod: TC | Performed by: INTERNAL MEDICINE

## 2025-05-13 PROCEDURE — 85025 COMPLETE CBC W/AUTO DIFF WBC: CPT

## 2025-05-13 PROCEDURE — 94640 AIRWAY INHALATION TREATMENT: CPT

## 2025-05-13 PROCEDURE — 96375 TX/PRO/DX INJ NEW DRUG ADDON: CPT

## 2025-05-13 PROCEDURE — 99285 EMERGENCY DEPT VISIT HI MDM: CPT

## 2025-05-13 PROCEDURE — 99223 1ST HOSP IP/OBS HIGH 75: CPT | Performed by: INTERNAL MEDICINE

## 2025-05-13 PROCEDURE — 700105 HCHG RX REV CODE 258: Performed by: INTERNAL MEDICINE

## 2025-05-13 PROCEDURE — 700102 HCHG RX REV CODE 250 W/ 637 OVERRIDE(OP): Performed by: INTERNAL MEDICINE

## 2025-05-13 PROCEDURE — 770020 HCHG ROOM/CARE - TELE (206)

## 2025-05-13 PROCEDURE — A9270 NON-COVERED ITEM OR SERVICE: HCPCS | Performed by: INTERNAL MEDICINE

## 2025-05-13 PROCEDURE — 71045 X-RAY EXAM CHEST 1 VIEW: CPT

## 2025-05-13 PROCEDURE — 83735 ASSAY OF MAGNESIUM: CPT

## 2025-05-13 PROCEDURE — 84484 ASSAY OF TROPONIN QUANT: CPT

## 2025-05-13 PROCEDURE — 80307 DRUG TEST PRSMV CHEM ANLYZR: CPT

## 2025-05-13 PROCEDURE — 36415 COLL VENOUS BLD VENIPUNCTURE: CPT

## 2025-05-13 PROCEDURE — 0241U HCHG SARS-COV-2 COVID-19 NFCT DS RESP RNA 4 TRGT ED POC: CPT

## 2025-05-13 PROCEDURE — 96374 THER/PROPH/DIAG INJ IV PUSH: CPT

## 2025-05-13 PROCEDURE — 80053 COMPREHEN METABOLIC PANEL: CPT

## 2025-05-13 PROCEDURE — 83690 ASSAY OF LIPASE: CPT

## 2025-05-13 PROCEDURE — 93005 ELECTROCARDIOGRAM TRACING: CPT | Mod: TC | Performed by: EMERGENCY MEDICINE

## 2025-05-13 PROCEDURE — 84145 PROCALCITONIN (PCT): CPT

## 2025-05-13 PROCEDURE — 700101 HCHG RX REV CODE 250: Performed by: INTERNAL MEDICINE

## 2025-05-13 PROCEDURE — 700101 HCHG RX REV CODE 250: Performed by: EMERGENCY MEDICINE

## 2025-05-13 PROCEDURE — 94760 N-INVAS EAR/PLS OXIMETRY 1: CPT

## 2025-05-13 PROCEDURE — 700111 HCHG RX REV CODE 636 W/ 250 OVERRIDE (IP): Mod: JZ | Performed by: EMERGENCY MEDICINE

## 2025-05-13 PROCEDURE — 71275 CT ANGIOGRAPHY CHEST: CPT

## 2025-05-13 PROCEDURE — 700117 HCHG RX CONTRAST REV CODE 255: Performed by: EMERGENCY MEDICINE

## 2025-05-13 RX ORDER — ACETAMINOPHEN 325 MG/1
650 TABLET ORAL EVERY 6 HOURS PRN
Status: DISCONTINUED | OUTPATIENT
Start: 2025-05-13 | End: 2025-05-19 | Stop reason: HOSPADM

## 2025-05-13 RX ORDER — POLYETHYLENE GLYCOL 3350 17 G/17G
1 POWDER, FOR SOLUTION ORAL
Status: DISCONTINUED | OUTPATIENT
Start: 2025-05-13 | End: 2025-05-19 | Stop reason: HOSPADM

## 2025-05-13 RX ORDER — ALBUTEROL SULFATE 5 MG/ML
15 SOLUTION RESPIRATORY (INHALATION)
Status: COMPLETED | OUTPATIENT
Start: 2025-05-13 | End: 2025-05-13

## 2025-05-13 RX ORDER — OXYCODONE HYDROCHLORIDE 5 MG/1
5 TABLET ORAL
Refills: 0 | Status: DISCONTINUED | OUTPATIENT
Start: 2025-05-13 | End: 2025-05-19 | Stop reason: HOSPADM

## 2025-05-13 RX ORDER — PROMETHAZINE HYDROCHLORIDE 25 MG/1
12.5-25 TABLET ORAL EVERY 4 HOURS PRN
Status: DISCONTINUED | OUTPATIENT
Start: 2025-05-13 | End: 2025-05-19 | Stop reason: HOSPADM

## 2025-05-13 RX ORDER — ONDANSETRON 4 MG/1
4 TABLET, ORALLY DISINTEGRATING ORAL EVERY 4 HOURS PRN
Status: DISCONTINUED | OUTPATIENT
Start: 2025-05-13 | End: 2025-05-19 | Stop reason: HOSPADM

## 2025-05-13 RX ORDER — GUAIFENESIN/DEXTROMETHORPHAN 100-10MG/5
10 SYRUP ORAL EVERY 6 HOURS PRN
Status: DISCONTINUED | OUTPATIENT
Start: 2025-05-13 | End: 2025-05-19 | Stop reason: HOSPADM

## 2025-05-13 RX ORDER — HYDRALAZINE HYDROCHLORIDE 20 MG/ML
10 INJECTION INTRAMUSCULAR; INTRAVENOUS EVERY 4 HOURS PRN
Status: DISCONTINUED | OUTPATIENT
Start: 2025-05-13 | End: 2025-05-19 | Stop reason: HOSPADM

## 2025-05-13 RX ORDER — PROMETHAZINE HYDROCHLORIDE 25 MG/1
12.5-25 SUPPOSITORY RECTAL EVERY 4 HOURS PRN
Status: DISCONTINUED | OUTPATIENT
Start: 2025-05-13 | End: 2025-05-19 | Stop reason: HOSPADM

## 2025-05-13 RX ORDER — METHYLPREDNISOLONE SODIUM SUCCINATE 125 MG/2ML
125 INJECTION, POWDER, LYOPHILIZED, FOR SOLUTION INTRAMUSCULAR; INTRAVENOUS EVERY 6 HOURS
Status: DISCONTINUED | OUTPATIENT
Start: 2025-05-14 | End: 2025-05-13

## 2025-05-13 RX ORDER — IPRATROPIUM BROMIDE AND ALBUTEROL SULFATE 2.5; .5 MG/3ML; MG/3ML
3 SOLUTION RESPIRATORY (INHALATION)
Status: DISCONTINUED | OUTPATIENT
Start: 2025-05-13 | End: 2025-05-15

## 2025-05-13 RX ORDER — ENOXAPARIN SODIUM 100 MG/ML
40 INJECTION SUBCUTANEOUS DAILY
Status: DISCONTINUED | OUTPATIENT
Start: 2025-05-14 | End: 2025-05-19 | Stop reason: HOSPADM

## 2025-05-13 RX ORDER — OXYCODONE HYDROCHLORIDE 5 MG/1
2.5 TABLET ORAL
Refills: 0 | Status: DISCONTINUED | OUTPATIENT
Start: 2025-05-13 | End: 2025-05-19 | Stop reason: HOSPADM

## 2025-05-13 RX ORDER — PROCHLORPERAZINE EDISYLATE 5 MG/ML
5-10 INJECTION INTRAMUSCULAR; INTRAVENOUS EVERY 4 HOURS PRN
Status: DISCONTINUED | OUTPATIENT
Start: 2025-05-13 | End: 2025-05-19 | Stop reason: HOSPADM

## 2025-05-13 RX ORDER — HYDROMORPHONE HYDROCHLORIDE 1 MG/ML
0.25 INJECTION, SOLUTION INTRAMUSCULAR; INTRAVENOUS; SUBCUTANEOUS
Status: DISCONTINUED | OUTPATIENT
Start: 2025-05-13 | End: 2025-05-19 | Stop reason: HOSPADM

## 2025-05-13 RX ORDER — AZITHROMYCIN 250 MG/1
500 TABLET, FILM COATED ORAL DAILY
Status: COMPLETED | OUTPATIENT
Start: 2025-05-13 | End: 2025-05-15

## 2025-05-13 RX ORDER — FUROSEMIDE 10 MG/ML
40 INJECTION INTRAMUSCULAR; INTRAVENOUS ONCE
Status: COMPLETED | OUTPATIENT
Start: 2025-05-13 | End: 2025-05-13

## 2025-05-13 RX ORDER — AMOXICILLIN 250 MG
2 CAPSULE ORAL EVERY EVENING
Status: DISCONTINUED | OUTPATIENT
Start: 2025-05-13 | End: 2025-05-19 | Stop reason: HOSPADM

## 2025-05-13 RX ORDER — METHYLPREDNISOLONE SODIUM SUCCINATE 125 MG/2ML
125 INJECTION, POWDER, LYOPHILIZED, FOR SOLUTION INTRAMUSCULAR; INTRAVENOUS ONCE
Status: COMPLETED | OUTPATIENT
Start: 2025-05-13 | End: 2025-05-13

## 2025-05-13 RX ORDER — ONDANSETRON 2 MG/ML
4 INJECTION INTRAMUSCULAR; INTRAVENOUS EVERY 4 HOURS PRN
Status: DISCONTINUED | OUTPATIENT
Start: 2025-05-13 | End: 2025-05-19 | Stop reason: HOSPADM

## 2025-05-13 RX ADMIN — CEFTRIAXONE SODIUM 2000 MG: 10 INJECTION, POWDER, FOR SOLUTION INTRAVENOUS at 23:04

## 2025-05-13 RX ADMIN — AZITHROMYCIN DIHYDRATE 500 MG: 250 TABLET ORAL at 23:03

## 2025-05-13 RX ADMIN — FUROSEMIDE 40 MG: 10 INJECTION, SOLUTION INTRAVENOUS at 23:03

## 2025-05-13 RX ADMIN — METHYLPREDNISOLONE SODIUM SUCCINATE 125 MG: 125 INJECTION INTRAMUSCULAR; INTRAVENOUS at 19:50

## 2025-05-13 RX ADMIN — IPRATROPIUM BROMIDE AND ALBUTEROL SULFATE 3 ML: .5; 2.5 SOLUTION RESPIRATORY (INHALATION) at 23:07

## 2025-05-13 RX ADMIN — IOHEXOL 50 ML: 350 INJECTION, SOLUTION INTRAVENOUS at 21:06

## 2025-05-13 RX ADMIN — ONDANSETRON 4 MG: 2 INJECTION INTRAMUSCULAR; INTRAVENOUS at 23:10

## 2025-05-13 RX ADMIN — ALBUTEROL SULFATE 15 MG/HR: 2.5 SOLUTION RESPIRATORY (INHALATION) at 19:34

## 2025-05-13 ASSESSMENT — ENCOUNTER SYMPTOMS
SHORTNESS OF BREATH: 1
BRUISES/BLEEDS EASILY: 0
NAUSEA: 0
HEADACHES: 0
NERVOUS/ANXIOUS: 0
SPUTUM PRODUCTION: 0
VOMITING: 0
NECK PAIN: 0
HEMOPTYSIS: 0
ORTHOPNEA: 0
BLURRED VISION: 0
DOUBLE VISION: 0
CHILLS: 1
TREMORS: 0
SPEECH CHANGE: 0
PHOTOPHOBIA: 0
FLANK PAIN: 0
HEARTBURN: 0
FEVER: 1
POLYDIPSIA: 0
COUGH: 1
HALLUCINATIONS: 0
PALPITATIONS: 0
WEIGHT LOSS: 0
BACK PAIN: 0
FOCAL WEAKNESS: 0

## 2025-05-13 ASSESSMENT — COPD QUESTIONNAIRES
DURING THE PAST 4 WEEKS HOW MUCH DID YOU FEEL SHORT OF BREATH: NONE/LITTLE OF THE TIME
HAVE YOU SMOKED AT LEAST 100 CIGARETTES IN YOUR ENTIRE LIFE: NO/DON'T KNOW
DO YOU EVER COUGH UP ANY MUCUS OR PHLEGM?: NO/ONLY WITH OCCASIONAL COLDS OR INFECTIONS
COPD SCREENING SCORE: 0

## 2025-05-13 ASSESSMENT — FIBROSIS 4 INDEX: FIB4 SCORE: 0.7

## 2025-05-13 ASSESSMENT — LIFESTYLE VARIABLES: SUBSTANCE_ABUSE: 0

## 2025-05-14 ENCOUNTER — APPOINTMENT (OUTPATIENT)
Dept: CARDIOLOGY | Facility: MEDICAL CENTER | Age: 40
DRG: 189 | End: 2025-05-14
Attending: INTERNAL MEDICINE
Payer: COMMERCIAL

## 2025-05-14 PROBLEM — R73.9 HYPERGLYCEMIA: Status: ACTIVE | Noted: 2025-05-14

## 2025-05-14 PROBLEM — D72.829 LEUKOCYTOSIS: Status: ACTIVE | Noted: 2025-05-14

## 2025-05-14 LAB
ALBUMIN SERPL BCP-MCNC: 3.8 G/DL (ref 3.2–4.9)
ALBUMIN/GLOB SERPL: 0.8 G/DL
ALP SERPL-CCNC: 137 U/L (ref 30–99)
ALT SERPL-CCNC: 36 U/L (ref 2–50)
AMPHET UR QL SCN: NEGATIVE
ANION GAP SERPL CALC-SCNC: 10 MMOL/L (ref 7–16)
AST SERPL-CCNC: 24 U/L (ref 12–45)
B PARAP IS1001 DNA NPH QL NAA+NON-PROBE: NOT DETECTED
B PERT.PT PRMT NPH QL NAA+NON-PROBE: NOT DETECTED
BARBITURATES UR QL SCN: NEGATIVE
BASOPHILS # BLD AUTO: 0.2 % (ref 0–1.8)
BASOPHILS # BLD: 0.02 K/UL (ref 0–0.12)
BENZODIAZ UR QL SCN: NEGATIVE
BILIRUB SERPL-MCNC: 0.4 MG/DL (ref 0.1–1.5)
BUN SERPL-MCNC: 17 MG/DL (ref 8–22)
BZE UR QL SCN: NEGATIVE
C PNEUM DNA NPH QL NAA+NON-PROBE: NOT DETECTED
CALCIUM ALBUM COR SERPL-MCNC: 9.3 MG/DL (ref 8.5–10.5)
CALCIUM SERPL-MCNC: 9.1 MG/DL (ref 8.5–10.5)
CANNABINOIDS UR QL SCN: NEGATIVE
CHLORIDE SERPL-SCNC: 100 MMOL/L (ref 96–112)
CO2 SERPL-SCNC: 27 MMOL/L (ref 20–33)
CREAT SERPL-MCNC: 0.81 MG/DL (ref 0.5–1.4)
EOSINOPHIL # BLD AUTO: 0 K/UL (ref 0–0.51)
EOSINOPHIL NFR BLD: 0 % (ref 0–6.9)
ERYTHROCYTE [DISTWIDTH] IN BLOOD BY AUTOMATED COUNT: 46.9 FL (ref 35.9–50)
EST. AVERAGE GLUCOSE BLD GHB EST-MCNC: 126 MG/DL
FENTANYL UR QL: NEGATIVE
FLUAV RNA NPH QL NAA+NON-PROBE: NOT DETECTED
FLUBV RNA NPH QL NAA+NON-PROBE: NOT DETECTED
GFR SERPLBLD CREATININE-BSD FMLA CKD-EPI: 115 ML/MIN/1.73 M 2
GLOBULIN SER CALC-MCNC: 4.5 G/DL (ref 1.9–3.5)
GLUCOSE SERPL-MCNC: 140 MG/DL (ref 65–99)
HADV DNA NPH QL NAA+NON-PROBE: NOT DETECTED
HBA1C MFR BLD: 6 % (ref 4–5.6)
HCOV 229E RNA NPH QL NAA+NON-PROBE: NOT DETECTED
HCOV HKU1 RNA NPH QL NAA+NON-PROBE: NOT DETECTED
HCOV NL63 RNA NPH QL NAA+NON-PROBE: NOT DETECTED
HCOV OC43 RNA NPH QL NAA+NON-PROBE: NOT DETECTED
HCT VFR BLD AUTO: 43 % (ref 42–52)
HGB BLD-MCNC: 14.2 G/DL (ref 14–18)
HMPV RNA NPH QL NAA+NON-PROBE: NOT DETECTED
HPIV1 RNA NPH QL NAA+NON-PROBE: NOT DETECTED
HPIV2 RNA NPH QL NAA+NON-PROBE: NOT DETECTED
HPIV3 RNA NPH QL NAA+NON-PROBE: NOT DETECTED
HPIV4 RNA NPH QL NAA+NON-PROBE: NOT DETECTED
IMM GRANULOCYTES # BLD AUTO: 0.09 K/UL (ref 0–0.11)
IMM GRANULOCYTES NFR BLD AUTO: 0.8 % (ref 0–0.9)
LV EJECT FRACT  99904: 65
LV EJECT FRACT MOD 2C 99903: 65.09
LV EJECT FRACT MOD 4C 99902: 64.8
LV EJECT FRACT MOD BP 99901: 65.29
LYMPHOCYTES # BLD AUTO: 0.72 K/UL (ref 1–4.8)
LYMPHOCYTES NFR BLD: 6.1 % (ref 22–41)
M PNEUMO DNA NPH QL NAA+NON-PROBE: NOT DETECTED
MCH RBC QN AUTO: 29 PG (ref 27–33)
MCHC RBC AUTO-ENTMCNC: 33 G/DL (ref 32.3–36.5)
MCV RBC AUTO: 87.8 FL (ref 81.4–97.8)
METHADONE UR QL SCN: NEGATIVE
MONOCYTES # BLD AUTO: 0.23 K/UL (ref 0–0.85)
MONOCYTES NFR BLD AUTO: 1.9 % (ref 0–13.4)
NEUTROPHILS # BLD AUTO: 10.82 K/UL (ref 1.82–7.42)
NEUTROPHILS NFR BLD: 91 % (ref 44–72)
NRBC # BLD AUTO: 0 K/UL
NRBC BLD-RTO: 0 /100 WBC (ref 0–0.2)
OPIATES UR QL SCN: NEGATIVE
OXYCODONE UR QL SCN: NEGATIVE
PCP UR QL SCN: NEGATIVE
PLATELET # BLD AUTO: 299 K/UL (ref 164–446)
PMV BLD AUTO: 9.7 FL (ref 9–12.9)
POTASSIUM SERPL-SCNC: 4.4 MMOL/L (ref 3.6–5.5)
PROPOXYPH UR QL SCN: NEGATIVE
PROT SERPL-MCNC: 8.3 G/DL (ref 6–8.2)
RBC # BLD AUTO: 4.9 M/UL (ref 4.7–6.1)
RSV RNA NPH QL NAA+NON-PROBE: NOT DETECTED
RV+EV RNA NPH QL NAA+NON-PROBE: NOT DETECTED
SARS-COV-2 RNA NPH QL NAA+NON-PROBE: NOTDETECTED
SODIUM SERPL-SCNC: 137 MMOL/L (ref 135–145)
WBC # BLD AUTO: 11.9 K/UL (ref 4.8–10.8)

## 2025-05-14 PROCEDURE — 0202U NFCT DS 22 TRGT SARS-COV-2: CPT

## 2025-05-14 PROCEDURE — 94640 AIRWAY INHALATION TREATMENT: CPT

## 2025-05-14 PROCEDURE — 80053 COMPREHEN METABOLIC PANEL: CPT

## 2025-05-14 PROCEDURE — A9270 NON-COVERED ITEM OR SERVICE: HCPCS | Performed by: INTERNAL MEDICINE

## 2025-05-14 PROCEDURE — 83036 HEMOGLOBIN GLYCOSYLATED A1C: CPT

## 2025-05-14 PROCEDURE — 99233 SBSQ HOSP IP/OBS HIGH 50: CPT | Performed by: HOSPITALIST

## 2025-05-14 PROCEDURE — 93306 TTE W/DOPPLER COMPLETE: CPT

## 2025-05-14 PROCEDURE — 700111 HCHG RX REV CODE 636 W/ 250 OVERRIDE (IP): Performed by: INTERNAL MEDICINE

## 2025-05-14 PROCEDURE — 700101 HCHG RX REV CODE 250: Performed by: INTERNAL MEDICINE

## 2025-05-14 PROCEDURE — 36415 COLL VENOUS BLD VENIPUNCTURE: CPT

## 2025-05-14 PROCEDURE — 700111 HCHG RX REV CODE 636 W/ 250 OVERRIDE (IP): Mod: JZ | Performed by: HOSPITALIST

## 2025-05-14 PROCEDURE — 700105 HCHG RX REV CODE 258: Performed by: INTERNAL MEDICINE

## 2025-05-14 PROCEDURE — 770020 HCHG ROOM/CARE - TELE (206)

## 2025-05-14 PROCEDURE — 93306 TTE W/DOPPLER COMPLETE: CPT | Mod: 26 | Performed by: INTERNAL MEDICINE

## 2025-05-14 PROCEDURE — 85025 COMPLETE CBC W/AUTO DIFF WBC: CPT

## 2025-05-14 PROCEDURE — 700102 HCHG RX REV CODE 250 W/ 637 OVERRIDE(OP): Performed by: INTERNAL MEDICINE

## 2025-05-14 RX ORDER — FUROSEMIDE 10 MG/ML
20 INJECTION INTRAMUSCULAR; INTRAVENOUS DAILY
Status: DISCONTINUED | OUTPATIENT
Start: 2025-05-14 | End: 2025-05-15

## 2025-05-14 RX ADMIN — ACETAMINOPHEN 650 MG: 325 TABLET ORAL at 04:04

## 2025-05-14 RX ADMIN — ENOXAPARIN SODIUM 40 MG: 100 INJECTION SUBCUTANEOUS at 17:06

## 2025-05-14 RX ADMIN — IPRATROPIUM BROMIDE AND ALBUTEROL SULFATE 3 ML: .5; 2.5 SOLUTION RESPIRATORY (INHALATION) at 22:32

## 2025-05-14 RX ADMIN — ACETAMINOPHEN 650 MG: 325 TABLET ORAL at 11:58

## 2025-05-14 RX ADMIN — AZITHROMYCIN DIHYDRATE 500 MG: 250 TABLET ORAL at 17:05

## 2025-05-14 RX ADMIN — OXYCODONE 2.5 MG: 5 TABLET ORAL at 07:35

## 2025-05-14 RX ADMIN — SENNOSIDES AND DOCUSATE SODIUM 2 TABLET: 50; 8.6 TABLET ORAL at 17:05

## 2025-05-14 RX ADMIN — FUROSEMIDE 20 MG: 10 INJECTION, SOLUTION INTRAVENOUS at 17:05

## 2025-05-14 RX ADMIN — IPRATROPIUM BROMIDE AND ALBUTEROL SULFATE 3 ML: .5; 2.5 SOLUTION RESPIRATORY (INHALATION) at 14:37

## 2025-05-14 RX ADMIN — CEFTRIAXONE SODIUM 2000 MG: 10 INJECTION, POWDER, FOR SOLUTION INTRAVENOUS at 17:09

## 2025-05-14 RX ADMIN — IPRATROPIUM BROMIDE AND ALBUTEROL SULFATE 3 ML: .5; 2.5 SOLUTION RESPIRATORY (INHALATION) at 19:01

## 2025-05-14 RX ADMIN — IPRATROPIUM BROMIDE AND ALBUTEROL SULFATE 3 ML: .5; 2.5 SOLUTION RESPIRATORY (INHALATION) at 06:59

## 2025-05-14 SDOH — ECONOMIC STABILITY: TRANSPORTATION INSECURITY
IN THE PAST 12 MONTHS, HAS THE LACK OF TRANSPORTATION KEPT YOU FROM MEDICAL APPOINTMENTS OR FROM GETTING MEDICATIONS?: NO

## 2025-05-14 SDOH — ECONOMIC STABILITY: TRANSPORTATION INSECURITY
IN THE PAST 12 MONTHS, HAS LACK OF RELIABLE TRANSPORTATION KEPT YOU FROM MEDICAL APPOINTMENTS, MEETINGS, WORK OR FROM GETTING THINGS NEEDED FOR DAILY LIVING?: NO

## 2025-05-14 ASSESSMENT — PATIENT HEALTH QUESTIONNAIRE - PHQ9
SUM OF ALL RESPONSES TO PHQ9 QUESTIONS 1 AND 2: 0
1. LITTLE INTEREST OR PLEASURE IN DOING THINGS: NOT AT ALL
1. LITTLE INTEREST OR PLEASURE IN DOING THINGS: NOT AT ALL
2. FEELING DOWN, DEPRESSED, IRRITABLE, OR HOPELESS: NOT AT ALL
SUM OF ALL RESPONSES TO PHQ9 QUESTIONS 1 AND 2: 0

## 2025-05-14 ASSESSMENT — SOCIAL DETERMINANTS OF HEALTH (SDOH)
WITHIN THE PAST 12 MONTHS, THE FOOD YOU BOUGHT JUST DIDN'T LAST AND YOU DIDN'T HAVE MONEY TO GET MORE: NEVER TRUE
WITHIN THE LAST YEAR, HAVE YOU BEEN HUMILIATED OR EMOTIONALLY ABUSED IN OTHER WAYS BY YOUR PARTNER OR EX-PARTNER?: NO
WITHIN THE LAST YEAR, HAVE YOU BEEN AFRAID OF YOUR PARTNER OR EX-PARTNER?: NO
WITHIN THE LAST YEAR, HAVE YOU BEEN KICKED, HIT, SLAPPED, OR OTHERWISE PHYSICALLY HURT BY YOUR PARTNER OR EX-PARTNER?: NO
WITHIN THE LAST YEAR, HAVE TO BEEN RAPED OR FORCED TO HAVE ANY KIND OF SEXUAL ACTIVITY BY YOUR PARTNER OR EX-PARTNER?: NO
IN THE PAST 12 MONTHS, HAS THE ELECTRIC, GAS, OIL, OR WATER COMPANY THREATENED TO SHUT OFF SERVICE IN YOUR HOME?: NO
WITHIN THE PAST 12 MONTHS, YOU WORRIED THAT YOUR FOOD WOULD RUN OUT BEFORE YOU GOT THE MONEY TO BUY MORE: NEVER TRUE

## 2025-05-14 ASSESSMENT — ENCOUNTER SYMPTOMS
COUGH: 0
HEADACHES: 0
HEMOPTYSIS: 0
MYALGIAS: 0
PALPITATIONS: 0
WHEEZING: 0
NAUSEA: 0
CHILLS: 0
PND: 0
HEARTBURN: 0
BACK PAIN: 0
DEPRESSION: 0
FEVER: 0
VOMITING: 0
BLURRED VISION: 0
DOUBLE VISION: 0
BRUISES/BLEEDS EASILY: 0
CLAUDICATION: 0
DIZZINESS: 0

## 2025-05-14 ASSESSMENT — FIBROSIS 4 INDEX
FIB4 SCORE: 0.67
FIB4 SCORE: 0.67

## 2025-05-14 ASSESSMENT — COGNITIVE AND FUNCTIONAL STATUS - GENERAL
WALKING IN HOSPITAL ROOM: A LITTLE
MOBILITY SCORE: 22
SUGGESTED CMS G CODE MODIFIER MOBILITY: CJ
DAILY ACTIVITIY SCORE: 24
SUGGESTED CMS G CODE MODIFIER DAILY ACTIVITY: CH
CLIMB 3 TO 5 STEPS WITH RAILING: A LITTLE

## 2025-05-14 ASSESSMENT — LIFESTYLE VARIABLES
TOTAL SCORE: 0
DOES PATIENT WANT TO STOP DRINKING: NO
AVERAGE NUMBER OF DAYS PER WEEK YOU HAVE A DRINK CONTAINING ALCOHOL: 0
HAVE PEOPLE ANNOYED YOU BY CRITICIZING YOUR DRINKING: NO
HOW MANY TIMES IN THE PAST YEAR HAVE YOU HAD 5 OR MORE DRINKS IN A DAY: 0
TOTAL SCORE: 0
EVER HAD A DRINK FIRST THING IN THE MORNING TO STEADY YOUR NERVES TO GET RID OF A HANGOVER: NO
CONSUMPTION TOTAL: NEGATIVE
EVER FELT BAD OR GUILTY ABOUT YOUR DRINKING: NO
TOTAL SCORE: 0
ALCOHOL_USE: NO
HAVE YOU EVER FELT YOU SHOULD CUT DOWN ON YOUR DRINKING: NO
ON A TYPICAL DAY WHEN YOU DRINK ALCOHOL HOW MANY DRINKS DO YOU HAVE: 0

## 2025-05-14 ASSESSMENT — PAIN DESCRIPTION - PAIN TYPE
TYPE: ACUTE PAIN

## 2025-05-14 NOTE — CARE PLAN
Problem: Bronchoconstriction  Goal: Improve in air movement and diminished wheezing  Description: Target End Date:  2 to 3 days1.  Implement inhaled treatments2.  Evaluate and manage medication effects  Outcome: Progressing   Duoneb Q4

## 2025-05-14 NOTE — HOSPITAL COURSE
Cornelio Deluna is a 40 y.o. male with history of bladder disorder, amphetamine abuse, prior history of smoking, who presented 5/13/2025 with complaints of nonproductive cough, shortness of breath for 2 weeks.  He reported subjective chills and fever in the last 2 days.  Denies lower extremity edema or chest pain.  He was seen at urgent care on 5/6 complaining of cough, sore throat, chest pain due to cough, prescribed Medrol pack.  He was then seen at ER on 5/11, COVID-19 and influenza negative, chest x-ray showed no infiltrates.  Was thought that he has viral condition.  Presented tachycardic, tachypneic, blood pressure 203/116, requires 6 L nasal cannula at this time.  CBC showed leukocytosis 15.2.  Pro BNP and troponin are not elevated.  CTA of the chest: No evidence of PE.  Hazy groundglass opacities in bilateral lung bases.  COVID-19 influenza RSV PCR negative.  Treatment included Solu-Medrol.  ERP patient had expiratory wheezes.  On my exam there are no wheezes, but bibasilar Rales

## 2025-05-14 NOTE — ED NOTES
Med rec updated and complete. Allergies reviewed.    Confirmed name and date of birth.    Pt recently finished a medrol dose paula.    Pt takes no supplements , no otc, and takes no prescription medications.      [facility]  [facility] [phone]    No additional information in dispense report at this time.

## 2025-05-14 NOTE — ED PROVIDER NOTES
ER Provider Note    Scribed for Thomas Machado D.O. by Kali Schulte. 5/13/2025  7:12 PM    Primary Care Provider: Emiliano Valdes P.A.-C.    CHIEF COMPLAINT  Chief Complaint   Patient presents with    Shortness of Breath     For 2 weeks.           HPI/ROS  LIMITATION TO HISTORY    used.     OUTSIDE HISTORIAN(S):  Patient's son acted as  and historian today.     Cornelio Deluna is a 40 y.o. male who presents to the Emergency Department for evaluation of shortness of breath onset prior to arrival today. Patient says he does not have any chest pain and he is not on oxygen at baseline. His son says that patient complained of a tactile fever and a cough while at the grocery store today when he became acutely short of breath. Nursing staff reports that the patient was hypoxic in the 70's when he arrived today. His son denies any recent nausea, vomiting, diarrhea, or other illness. Patient denies any history of asthma or COPD, or any significant cardiac history.     ROS as per HPI.    PAST MEDICAL HISTORY  Past Medical History:   Diagnosis Date    Urinary bladder disorder        SURGICAL HISTORY  Past Surgical History:   Procedure Laterality Date    IL CYSTOURETHROSCOPY N/A 5/26/2023    Procedure: CYSTOURETHROSCOPY WITH DIRECT VISION INTERNAL URETHROTOMY;  Surgeon: Alfa Forrest M.D.;  Location: SURGERY [facility];  Service: Urology    URETHROTOMY INTERNAL N/A 5/26/2023    Procedure: URETHROTOMY, INTERNAL;  Surgeon: Alfa Forrest M.D.;  Location: SURGERY [facility];  Service: Urology    OTHER      Cystoscopy       FAMILY HISTORY  None reported.     SOCIAL HISTORY   reports that he has never smoked. He has never used smokeless tobacco. He reports that he does not currently use alcohol. He reports that he does not use drugs.    CURRENT MEDICATIONS  Previous Medications    FLUTICASONE (FLONASE) 50 MCG/ACT NASAL SPRAY    Administer 1 [place] into affected  nostril(S) 2 times a day.    METHYLPREDNISOLONE (MEDROL DOSEPAK) 4 MG TABLET THERAPY PACK    Follow schedule on package instructions.    POLYMIXIN-TRIMETHOPRIM (POLYTRIM) 80578-1.1 UNIT/ML-% SOLUTION    Administer 1 Drop into both eyes every 4 hours.       ALLERGIES  Nkda [no known drug allergy]    PHYSICAL EXAM  BP (!) 203/119   Pulse (!) 108   Temp 36.6 °C (97.8 °F) (Temporal)   Resp (!) 22   SpO2 95% Comment: 78% RA    General: No acute distress. BMI > 30  HENT: Normocephalic, Mucus membranes are moist.   Chest: Tachypnea, decreased air movement and wheezing present. Mild accessory muscle use.   Cardiovascular: Tachycardic. No pedal edema. No peripheral cyanosis.  Abdomen: Non distended.  Neuro: Awake, Conversive, Able to relay recent events.  Psychiatric: Calm and cooperative.       INITIAL ASSESSMENT  Patient presents with acute shortness of breath. He was hypoxic on arrival and responsive to supplemental oxygen at 6 liters he has wheezing and decreased air movement. Albuterol and steroids with be initiated. There is concern for pneumonia, bronchitis, PE and COVID.     ED Observation Status? No; Patient does not meet criteria for ED Observation.     DIAGNOSTIC STUDIES    Labs:   Results for orders placed or performed during the hospital encounter of 05/13/25   CBC WITH DIFFERENTIAL    Collection Time: 05/13/25  7:40 PM   Result Value Ref Range    WBC 15.2 (H) 4.8 - 10.8 K/uL    RBC 5.13 4.70 - 6.10 M/uL    Hemoglobin 14.6 14.0 - 18.0 g/dL    Hematocrit 45.3 42.0 - 52.0 %    MCV 88.3 81.4 - 97.8 fL    MCH 28.5 27.0 - 33.0 pg    MCHC 32.2 (L) 32.3 - 36.5 g/dL    RDW 46.7 35.9 - 50.0 fL    Platelet Count 292 164 - 446 K/uL    MPV 9.3 9.0 - 12.9 fL    Neutrophils-Polys 79.10 (H) 44.00 - 72.00 %    Lymphocytes 12.90 (L) 22.00 - 41.00 %    Monocytes 6.80 0.00 - 13.40 %    Eosinophils 0.40 0.00 - 6.90 %    Basophils 0.30 0.00 - 1.80 %    Immature Granulocytes 0.50 0.00 - 0.90 %    Nucleated RBC 0.00 0.00 - 0.20  /100 WBC    Neutrophils (Absolute) 12.01 (H) 1.82 - 7.42 K/uL    Lymphs (Absolute) 1.95 1.00 - 4.80 K/uL    Monos (Absolute) 1.03 (H) 0.00 - 0.85 K/uL    Eos (Absolute) 0.06 0.00 - 0.51 K/uL    Baso (Absolute) 0.05 0.00 - 0.12 K/uL    Immature Granulocytes (abs) 0.07 0.00 - 0.11 K/uL    NRBC (Absolute) 0.00 K/uL   COMP METABOLIC PANEL    Collection Time: 05/13/25  7:40 PM   Result Value Ref Range    Sodium 139 135 - 145 mmol/L    Potassium 3.9 3.6 - 5.5 mmol/L    Chloride 103 96 - 112 mmol/L    Co2 24 20 - 33 mmol/L    Anion Gap 12.0 7.0 - 16.0    Glucose 118 (H) 65 - 99 mg/dL    Bun 17 8 - 22 mg/dL    Creatinine 0.78 0.50 - 1.40 mg/dL    Calcium 9.1 8.5 - 10.5 mg/dL    Correct Calcium 9.3 8.5 - 10.5 mg/dL    AST(SGOT) 30 12 - 45 U/L    ALT(SGPT) 36 2 - 50 U/L    Alkaline Phosphatase 104 (H) 30 - 99 U/L    Total Bilirubin 0.4 0.1 - 1.5 mg/dL    Albumin 3.8 3.2 - 4.9 g/dL    Total Protein 8.3 (H) 6.0 - 8.2 g/dL    Globulin 4.5 (H) 1.9 - 3.5 g/dL    A-G Ratio 0.8 g/dL   LIPASE    Collection Time: 05/13/25  7:40 PM   Result Value Ref Range    Lipase 22 11 - 82 U/L   TROPONIN    Collection Time: 05/13/25  7:40 PM   Result Value Ref Range    Troponin T 7 6 - 19 ng/L   proBrain Natriuretic Peptide, NT    Collection Time: 05/13/25  7:40 PM   Result Value Ref Range    NT-proBNP <36 0 - 125 pg/mL   ESTIMATED GFR    Collection Time: 05/13/25  7:40 PM   Result Value Ref Range    GFR (CKD-EPI) 116 >60 mL/min/1.73 m 2   POC CoV-2, FLU A/B, RSV by PCR    Collection Time: 05/13/25  7:46 PM   Result Value Ref Range    POC Influenza A RNA, PCR Negative Negative    POC Influenza B RNA, PCR Negative Negative    POC RSV, by PCR Negative Negative    POC SARS-CoV-2, PCR NotDetected NotDetected   EKG    Collection Time: 05/13/25  9:28 PM   Result Value Ref Range    Report       [facility] Emergency Dept.    Test Date:  2025-05-13  Pt Name:    PAZ MCMILLAN     Department: ER  MRN:        [id number]                       Room:  Gender:     Male                         Technician: [id number]  :        1985                   Requested By:ER TRIAGE PROTOCOL  Order #:    [id number]                    Reading MD: JOHN GANNON D.O.    Measurements  Intervals                                Axis  Rate:       88                           P:          0  DE:         156                          QRS:        72  QRSD:       114                          T:          59  QT:         356  QTc:        431    Interpretive Statements  Sinus rhythm    Probable RV involvement, suggest recording right precordial leads  Compared to ECG 2023 20:50:15  artifact  Electronically Signed On 2025 21:28:31 PDT by JOHN GANNON D.O.          EKG:   I have independently interpreted the above EKG.    Radiology:   The attending emergency physician has independently interpreted the diagnostic imaging associated with this visit and am waiting the final reading from the radiologist.   Preliminary interpretation is as follows: No pneumonia  Radiologist interpretation:   CT-CTA CHEST PULMONARY ARTERY W/ RECONS   Final Result      1.  No CT evidence of pulmonary embolism but evaluation is limited due to motion.   2.  Hazy groundglass opacity in the bilateral lung bases, likely atelectasis.         DX-CHEST-PORTABLE (1 VIEW)   Final Result         1. No acute cardiopulmonary abnormalities are identified.           COURSE & MEDICAL DECISION MAKING     COURSE AND PLAN  7:12 PM - Patient seen and examined at bedside. He arrives today after becoming acutely short of breath, and was hypoxic upon arrival to the ED. Discussed plan of care, including providing him with breathing treatments and evaluation via lab work and imaging. Patient agrees to the plan of care. The patient will be medicated with Proventil. Ordered for EKG, BNP, Troponin, Lipase, CMP, CBC with differential, POCT CoV, Flu A/B, and RSV by PCR, and DX-Chest to evaluate his symptoms.      7:38 PM - Patient's blood pressure has improved at this time.     ED Summary: Patient presented with shortness of breath.  Is reported to me that his pulse oxygen was 76% in triage.  He was placed on O2 at 6 L.  His pulse oximetry with that is 91 to 92%.  He is tachypneic, was initially working a little bit hard to breathe and he received a nebulizer treatment and this did improve him symptoms but he still remains tachypneic.  CT was done and shows no pneumonia, no pulmonary embolism.  He has no history of heart or lung disease but he does have wheezes I suspect this may be a bronchospasm he has been having a cough COVID and influenza are negative.  The I spoke with the hospitalist for admission and the patient will be admitted for further evaluation and treatment.    CRITICAL CARE  The very real possibilty of a deterioration of this patient's condition required the highest level of my preparedness for sudden, emergent intervention.  I provided critical care services, which included medication orders, frequent reevaluations of the patient's condition and response to treatment, ordering and reviewing test results, and discussing the case with various consultants.  The critical care time associated with the care of the patient was 35 minutes. Review chart for interventions. This time is exclusive of any other billable procedures.      DISPOSITION AND DISCUSSIONS  I have discussed management of the patient with the following physicians and ROB's: Discussed with hospitalist      Admitted in guarded condition    FINAL DIAGNOSIS  1. Respiratory distress    2. Acute on chronic respiratory failure with hypoxemia (HCC)    3. Bronchospasm        Kali COOPER (Sean), am scribing for, and in the presence of, Thomas Machado D.O..    Electronically signed by: Kali Schulte (Sean), 5/13/2025    Thomas COOPER D.O. personally performed the services described in this documentation, as scribed by Kali  Eris in my presence, and it is both accurate and complete.     The note accurately reflects work and decisions made by me.  Thomas Machado D.O.  5/14/2025  9:18 PM

## 2025-05-14 NOTE — PROGRESS NOTES
Hospital Medicine Daily Progress Note    Date of Service  5/14/2025    Chief Complaint  Cornelio Deluna is a 40 y.o. male admitted 5/13/2025 with shortness of breath    Hospital Course  Cornelio Deluna is a 40 y.o. male with history of bladder disorder, amphetamine abuse, prior history of smoking, who presented 5/13/2025 with complaints of nonproductive cough, shortness of breath for 2 weeks.  He reported subjective chills and fever in the last 2 days.  Denies lower extremity edema or chest pain.  He was seen at urgent care on 5/6 complaining of cough, sore throat, chest pain due to cough, prescribed Medrol pack.  He was then seen at ER on 5/11, COVID-19 and influenza negative, chest x-ray showed no infiltrates.  Was thought that he has viral condition.  Presented tachycardic, tachypneic, blood pressure 203/116, requires 6 L nasal cannula at this time.  CBC showed leukocytosis 15.2.  Pro BNP and troponin are not elevated.  CTA of the chest: No evidence of PE.  Hazy groundglass opacities in bilateral lung bases.  COVID-19 influenza RSV PCR negative.  Treatment included Solu-Medrol.  ERP patient had expiratory wheezes.  On my exam there are no wheezes, but bibasilar Rales    Interval Problem Update  5/14: Patient is new to me today, patient is sitting at the edge of the bed, family is at bedside, he is alert oriented follows commands, trace lower extremity edema, chest x-ray showed cephalization, I reviewed echocardiogram that showed normal EF, CTA chest was negative for PE but showed probably bilateral infiltrates, continue IV antibiotics, continue IV Lasix, discussed with bedside nurse discussed with charge nurse pharmacist    I have discussed this patient's plan of care and discharge plan at IDT rounds today with Case Management, Nursing, Nursing leadership, and other members of the IDT team.    Consultants/Specialty      Code Status  Full Code    Disposition  The patient is not  medically cleared for discharge to home or a post-acute facility.      I have placed the appropriate orders for post-discharge needs.    Review of Systems  Review of Systems   Constitutional:  Negative for chills and fever.   Eyes:  Negative for blurred vision and double vision.   Respiratory:  Negative for cough, hemoptysis and wheezing.    Cardiovascular:  Negative for chest pain, palpitations, claudication, leg swelling and PND.   Gastrointestinal:  Negative for heartburn, nausea and vomiting.   Genitourinary:  Negative for hematuria and urgency.   Musculoskeletal:  Negative for back pain and myalgias.   Skin:  Negative for rash.   Neurological:  Negative for dizziness and headaches.   Endo/Heme/Allergies:  Does not bruise/bleed easily.   Psychiatric/Behavioral:  Negative for depression.         Physical Exam  Temp:  [36.5 °C (97.7 °F)-37.2 °C (99 °F)] 36.8 °C (98.2 °F)  Pulse:  [] 71  Resp:  [16-33] 17  BP: (127-203)/() 155/75  SpO2:  [89 %-96 %] 91 %    Physical Exam  Vitals and nursing note reviewed.   Constitutional:       General: He is not in acute distress.     Appearance: Normal appearance.   Eyes:      General:         Right eye: No discharge.         Left eye: No discharge.   Cardiovascular:      Rate and Rhythm: Normal rate and regular rhythm.      Pulses: Normal pulses.      Heart sounds: Normal heart sounds.   Pulmonary:      Effort: Pulmonary effort is normal. No respiratory distress.      Breath sounds: Rales present.   Abdominal:      General: Bowel sounds are normal. There is no distension.      Tenderness: There is no abdominal tenderness.   Musculoskeletal:         General: Normal range of motion.      Cervical back: Normal range of motion and neck supple.      Right lower leg: Edema present.      Left lower leg: Edema present.   Skin:     General: Skin is warm and dry.      Capillary Refill: Capillary refill takes less than 2 seconds.      Coloration: Skin is not jaundiced.    Neurological:      General: No focal deficit present.      Mental Status: He is alert and oriented to person, place, and time.      Cranial Nerves: No cranial nerve deficit.   Psychiatric:         Mood and Affect: Mood normal.         Behavior: Behavior normal.         Fluids    Intake/Output Summary (Last 24 hours) at 5/14/2025 1547  Last data filed at 5/14/2025 1010  Gross per 24 hour   Intake 240 ml   Output 200 ml   Net 40 ml        Laboratory  Recent Labs     05/13/25 1940 05/14/25  0519   WBC 15.2* 11.9*   RBC 5.13 4.90   HEMOGLOBIN 14.6 14.2   HEMATOCRIT 45.3 43.0   MCV 88.3 87.8   MCH 28.5 29.0   MCHC 32.2* 33.0   RDW 46.7 46.9   PLATELETCT 292 299   MPV 9.3 9.7     Recent Labs     05/13/25 1940 05/14/25 0519   SODIUM 139 137   POTASSIUM 3.9 4.4   CHLORIDE 103 100   CO2 24 27   GLUCOSE 118* 140*   BUN 17 17   CREATININE 0.78 0.81   CALCIUM 9.1 9.1                   Imaging  EC-ECHOCARDIOGRAM COMPLETE W/O CONT   Final Result      CT-CTA CHEST PULMONARY ARTERY W/ RECONS   Final Result      1.  No CT evidence of pulmonary embolism but evaluation is limited due to motion.   2.  Hazy groundglass opacity in the bilateral lung bases, likely atelectasis.         DX-CHEST-PORTABLE (1 VIEW)   Final Result         1. No acute cardiopulmonary abnormalities are identified.           Assessment/Plan  * Acute respiratory failure with hypoxia- (present on admission)  Assessment & Plan  40-year-old male who presented with cough and shortness of breath for 2 weeks, hypoxia on 6 L nasal cannula, tachypnea, tachycardia, leukocytosis WBC 15.2  CTA of the chest: Negative for PE; bibasilar groundglass opacities that could be atypical infection or edema  COVID-19/influenza/RSV screen negative  Plan: Respiratory PCR panel  Transthoracic echo to evaluate for cardiomyopathy  Ceftriaxone, azithromycin for possible community-acquired pneumonia  IV Lasix 40 mg once  DuoNeb every 4 hours  Pulse ox, wean down oxygen as  tolerated      Continue requiring 4 L of oxygen  Respiratory panel negative  CTA negative for PE  Procalcitonin negative  Leukocytosis improving  Continue IV antibiotics for now for possible pneumonia  Echo showed normal EF    Leukocytosis- (present on admission)  Assessment & Plan  Leukocytosis trending down  Procalcitonin is negative    Hyperglycemia- (present on admission)  Assessment & Plan  Check A1c    Hypertensive urgency- (present on admission)  Assessment & Plan  Presented with blood pressure 203/119, spontaneously improved  Plan:  IV Lasix 40 mg once for possible mild fluid overload  Monitor  IV hydralazine as needed      Continue monitoring  Continue Lasix           VTE prophylaxis: Lovenox    I have performed a physical exam and reviewed and updated ROS and Plan today (5/14/2025). In review of yesterday's note (5/13/2025), there are no changes except as documented above.      I reviewed CBC  I reviewed CMP  I reviewed troponin level  I reviewed BNP  A review echocardiogram  I reviewed CTA chest  Patient is on IV Lasix monitoring for side effects include but not limited to electrolyte abnormalities hypotension kidney injury  Patient is on IV ceftriaxone monitor for side effects include but not limited to C. difficile infection interstitial nephritis  Continue telemetry monitoring patient is on azithromycin monitor for side effects  Discussed with clinical pharmacist  Review admitting doctor H&P  Review ER physician note

## 2025-05-14 NOTE — H&P
Hospital Medicine History & Physical Note    Date of Service  5/13/2025    Primary Care Physician  Emiliano Valdes P.A.-C.      Code Status  Full Code    Chief Complaint  Chief Complaint   Patient presents with    Shortness of Breath     For 2 weeks.           History of Presenting Illness  Cornelio Deluna is a 40 y.o. male with history of bladder disorder, amphetamine abuse, prior history of smoking, who presented 5/13/2025 with complaints of nonproductive cough, shortness of breath for 2 weeks.  He reported subjective chills and fever in the last 2 days.  Denies lower extremity edema or chest pain.  He was seen at urgent care on 5/6 complaining of cough, sore throat, chest pain due to cough, prescribed Medrol pack.  He was then seen at ER on 5/11, COVID-19 and influenza negative, chest x-ray showed no infiltrates.  Was thought that he has viral condition.  Presented tachycardic, tachypneic, blood pressure 203/116, requires 6 L nasal cannula at this time.  CBC showed leukocytosis 15.2.  Pro BNP and troponin are not elevated.  CTA of the chest: No evidence of PE.  Hazy groundglass opacities in bilateral lung bases.  COVID-19 influenza RSV PCR negative.  Treatment included Solu-Medrol.  ERP patient had expiratory wheezes.  On my exam there are no wheezes, but bibasilar Rales    I discussed the plan of care with patient, bedside RN, and ERP .    Review of Systems  Review of Systems   Constitutional:  Positive for chills, fever and malaise/fatigue. Negative for weight loss.   HENT:  Negative for ear pain, hearing loss and tinnitus.    Eyes:  Negative for blurred vision, double vision and photophobia.   Respiratory:  Positive for cough and shortness of breath. Negative for hemoptysis and sputum production.    Cardiovascular:  Negative for chest pain, palpitations and orthopnea.   Gastrointestinal:  Negative for heartburn, nausea and vomiting.   Genitourinary:  Negative for dysuria, flank pain,  frequency and hematuria.   Musculoskeletal:  Negative for back pain and neck pain.   Skin:  Negative for itching and rash.   Neurological:  Negative for tremors, speech change, focal weakness and headaches.   Endo/Heme/Allergies:  Negative for environmental allergies and polydipsia. Does not bruise/bleed easily.   Psychiatric/Behavioral:  Negative for hallucinations and substance abuse. The patient is not nervous/anxious.        Past Medical History   has a past medical history of Urinary bladder disorder.    Surgical History   has a past surgical history that includes other; pr cystourethroscopy (N/A, 5/26/2023); and urethrotomy internal (N/A, 5/26/2023).     Family History  family history is not on file.   Family history reviewed with patient. There is no family history that is pertinent to the chief complaint.     Social History   reports that he has never smoked. He has never used smokeless tobacco. He reports that he does not currently use alcohol. He reports that he does not use drugs.    Allergies  Allergies   Allergen Reactions    Nkda [No Known Drug Allergy]        Medications  Prior to Admission Medications   Prescriptions Last Dose Informant Patient Reported? Taking?   fluticasone (FLONASE) 50 MCG/ACT nasal spray   No No   Sig: Administer 1 Spray into affected nostril(S) 2 times a day.   Patient not taking: Reported on 5/6/2025   methylPREDNISolone (MEDROL DOSEPAK) 4 MG Tablet Therapy Pack   No No   Sig: Follow schedule on package instructions.   polymixin-trimethoprim (POLYTRIM) 05958-3.1 UNIT/ML-% Solution   No No   Sig: Administer 1 Drop into both eyes every 4 hours.   Patient not taking: Reported on 5/6/2025      Facility-Administered Medications: None       Physical Exam  Temp:  [36.6 °C (97.8 °F)] 36.6 °C (97.8 °F)  Pulse:  [] 80  Resp:  [22-33] 22  BP: (127-203)/() 127/68  SpO2:  [91 %-96 %] 91 %  Blood Pressure: 127/68   Temperature: 36.6 °C (97.8 °F)   Pulse: 80   Respiration: (!) 22    Pulse Oximetry: 91 %       Physical Exam  Vitals and nursing note reviewed.   Constitutional:       General: He is not in acute distress.     Appearance: Normal appearance.   HENT:      Head: Normocephalic and atraumatic.      Nose: Nose normal.      Mouth/Throat:      Mouth: Mucous membranes are moist.   Eyes:      Extraocular Movements: Extraocular movements intact.      Pupils: Pupils are equal, round, and reactive to light.   Cardiovascular:      Rate and Rhythm: Regular rhythm. Tachycardia present.   Pulmonary:      Effort: Pulmonary effort is normal.      Breath sounds: Rales present.   Abdominal:      General: Abdomen is flat. There is no distension.      Tenderness: There is no abdominal tenderness.   Musculoskeletal:         General: No swelling or deformity. Normal range of motion.      Cervical back: Normal range of motion and neck supple.   Skin:     General: Skin is warm and dry.   Neurological:      General: No focal deficit present.      Mental Status: He is alert and oriented to person, place, and time.   Psychiatric:         Mood and Affect: Mood normal.         Behavior: Behavior normal.         Laboratory:  Recent Labs     05/13/25 1940   WBC 15.2*   RBC 5.13   HEMOGLOBIN 14.6   HEMATOCRIT 45.3   MCV 88.3   MCH 28.5   MCHC 32.2*   RDW 46.7   PLATELETCT 292   MPV 9.3     Recent Labs     05/13/25 1940   SODIUM 139   POTASSIUM 3.9   CHLORIDE 103   CO2 24   GLUCOSE 118*   BUN 17   CREATININE 0.78   CALCIUM 9.1     Recent Labs     05/13/25 1940   ALTSGPT 36   ASTSGOT 30   ALKPHOSPHAT 104*   TBILIRUBIN 0.4   LIPASE 22   GLUCOSE 118*         Recent Labs     05/13/25 1940   NTPROBNP <36         Recent Labs     05/13/25 1940   TROPONINT 7       Imaging:  CT-CTA CHEST PULMONARY ARTERY W/ RECONS   Final Result      1.  No CT evidence of pulmonary embolism but evaluation is limited due to motion.   2.  Hazy groundglass opacity in the bilateral lung bases, likely atelectasis.         DX-CHEST-PORTABLE  (1 VIEW)   Final Result         1. No acute cardiopulmonary abnormalities are identified.          X-Ray:  I have personally reviewed the images and compared with prior images.    Assessment/Plan:  Justification for Admission Status  I anticipate this patient will require at least two midnights for appropriate medical management, necessitating inpatient admission because acute respiratory failure with hypoxia    Patient will need a Telemetry bed on MEDICAL service .  The need is secondary to acute respiratory failure with hypoxia.    * Acute respiratory failure with hypoxia- (present on admission)  Assessment & Plan  40-year-old male who presented with cough and shortness of breath for 2 weeks, hypoxia on 6 L nasal cannula, tachypnea, tachycardia, leukocytosis WBC 15.2  CTA of the chest: Negative for PE; bibasilar groundglass opacities that could be atypical infection or edema  COVID-19/influenza/RSV screen negative  Plan: Respiratory PCR panel  Transthoracic echo to evaluate for cardiomyopathy  Ceftriaxone, azithromycin for possible community-acquired pneumonia  IV Lasix 40 mg once  DuoNeb every 4 hours  Pulse ox, wean down oxygen as tolerated    Hypertensive urgency  Assessment & Plan  Presented with blood pressure 203/119, spontaneously improved  Plan:  IV Lasix 40 mg once for possible mild fluid overload  Monitor  IV hydralazine as needed          VTE prophylaxis: enoxaparin ppx

## 2025-05-14 NOTE — PROGRESS NOTES
4 Eyes Skin Assessment Completed by Lissette ZAMARRIPA RN and THEODORA Aquino.    Head WDL  Ears WDL  Nose WDL  Mouth WDL  Neck WDL  Breast/Chest WDL  Shoulder Blades WDL  Spine WDL  (R) Arm/Elbow/Hand WDL  (L) Arm/Elbow/Hand WDL  Abdomen WDL  Groin WDL  Scrotum/Coccyx/Buttocks WDL  (R) Leg WDL  (L) Leg WDL  (R) Heel/Foot/Toe Redness, Flaky  (L) Heel/Foot/Toe Redness, Flaky          Devices In Places Tele Box, Blood Pressure Cuff, Pulse Ox, and Nasal Cannula      Interventions In Place Gray Ear Foams and Pillows    Possible Skin Injury No    Pictures Uploaded Into Epic N/A  Wound Consult Placed N/A  RN Wound Prevention Protocol Ordered No

## 2025-05-14 NOTE — PROGRESS NOTES
Patient transported to T8 with no signs of acute distress: A&Ox 4, 5L NC, denies pain, telemetry box placed and monitor room notified and family at bedside. Discussed POC: labs, medications, and treatments with patient who demonstrates understanding and all questions answered. Bed locked/lowest position with call bell and possessions within reach. Patient denies additional needs at this time.

## 2025-05-14 NOTE — ED TRIAGE NOTES
Chief Complaint   Patient presents with    Shortness of Breath     For 2 weeks.         Vitals:    05/13/25 1908   BP: (!) 203/119   Pulse: (!) 108   Resp: (!) 22   Temp: 36.6 °C (97.8 °F)   SpO2: 95%

## 2025-05-14 NOTE — PROGRESS NOTES
Monitor Summary  Rhythm: SR  Rate: 71-84  Ectopy: None  Measurements: .16/.09/.37  ---12 hr Chart Review---   No image.

## 2025-05-14 NOTE — ASSESSMENT & PLAN NOTE
-Presented with blood pressure 203/119, spontaneously improved  -IV Lasix 40 mg once for possible mild fluid overload  -IV hydralazine as needed

## 2025-05-14 NOTE — ED NOTES
MS:  SR-ST . Unsustained HR of 180's during morning ambulation. APRN aware.   0.20/0.14/0.40    12 Hour Chart Check       Report to receiving RN given

## 2025-05-14 NOTE — ASSESSMENT & PLAN NOTE
-Presented with cough and shortness of breath for 2 weeks, hypoxia on 6 L nasal cannula, tachypnea, tachycardia, leukocytosis WBC 15.2  -CTA of the chest: Negative for PE; bibasilar groundglass opacities that could be atypical infection or edema  -COVID-19/influenza/RSV screen negative. Respiratory PCR panel negative.  -ECHO with normal EF and no wall motion abnormalities.  -Continue ceftriaxone, completed azithromycin for possible community-acquired pneumonia  -DuoNeb every 4 hours  -Given a dose of IV Solu-Medrol 125 mg once.  Currently on prednisone 40 mg daily.  -Pulmonology consulted.  Recommends outpatient sleep study and +/- right heart cath for pulmonary hypertension eval.  -Continuous pulse oximetry.  Maintain O2 saturations > 92%.  -Pending home oxygen arrangement.  Case management working on it.

## 2025-05-15 LAB
ANION GAP SERPL CALC-SCNC: 7 MMOL/L (ref 7–16)
BUN SERPL-MCNC: 18 MG/DL (ref 8–22)
CALCIUM SERPL-MCNC: 8.9 MG/DL (ref 8.5–10.5)
CHLORIDE SERPL-SCNC: 98 MMOL/L (ref 96–112)
CO2 SERPL-SCNC: 31 MMOL/L (ref 20–33)
CREAT SERPL-MCNC: 0.8 MG/DL (ref 0.5–1.4)
ERYTHROCYTE [DISTWIDTH] IN BLOOD BY AUTOMATED COUNT: 47.8 FL (ref 35.9–50)
GFR SERPLBLD CREATININE-BSD FMLA CKD-EPI: 115 ML/MIN/1.73 M 2
GLUCOSE SERPL-MCNC: 92 MG/DL (ref 65–99)
HCT VFR BLD AUTO: 41.6 % (ref 42–52)
HGB BLD-MCNC: 13.6 G/DL (ref 14–18)
MAGNESIUM SERPL-MCNC: 2.4 MG/DL (ref 1.5–2.5)
MCH RBC QN AUTO: 29.1 PG (ref 27–33)
MCHC RBC AUTO-ENTMCNC: 32.7 G/DL (ref 32.3–36.5)
MCV RBC AUTO: 89.1 FL (ref 81.4–97.8)
PLATELET # BLD AUTO: 296 K/UL (ref 164–446)
PMV BLD AUTO: 9.8 FL (ref 9–12.9)
POTASSIUM SERPL-SCNC: 4 MMOL/L (ref 3.6–5.5)
RBC # BLD AUTO: 4.67 M/UL (ref 4.7–6.1)
SODIUM SERPL-SCNC: 136 MMOL/L (ref 135–145)
WBC # BLD AUTO: 12.8 K/UL (ref 4.8–10.8)

## 2025-05-15 PROCEDURE — A9270 NON-COVERED ITEM OR SERVICE: HCPCS | Performed by: INTERNAL MEDICINE

## 2025-05-15 PROCEDURE — 83735 ASSAY OF MAGNESIUM: CPT

## 2025-05-15 PROCEDURE — 80048 BASIC METABOLIC PNL TOTAL CA: CPT

## 2025-05-15 PROCEDURE — 700111 HCHG RX REV CODE 636 W/ 250 OVERRIDE (IP): Mod: JZ | Performed by: INTERNAL MEDICINE

## 2025-05-15 PROCEDURE — 36415 COLL VENOUS BLD VENIPUNCTURE: CPT

## 2025-05-15 PROCEDURE — 99233 SBSQ HOSP IP/OBS HIGH 50: CPT | Performed by: HOSPITALIST

## 2025-05-15 PROCEDURE — 770006 HCHG ROOM/CARE - MED/SURG/GYN SEMI*

## 2025-05-15 PROCEDURE — 94640 AIRWAY INHALATION TREATMENT: CPT

## 2025-05-15 PROCEDURE — 700102 HCHG RX REV CODE 250 W/ 637 OVERRIDE(OP): Performed by: INTERNAL MEDICINE

## 2025-05-15 PROCEDURE — 700105 HCHG RX REV CODE 258: Performed by: INTERNAL MEDICINE

## 2025-05-15 PROCEDURE — 85027 COMPLETE CBC AUTOMATED: CPT

## 2025-05-15 PROCEDURE — 700101 HCHG RX REV CODE 250: Performed by: INTERNAL MEDICINE

## 2025-05-15 PROCEDURE — 700111 HCHG RX REV CODE 636 W/ 250 OVERRIDE (IP): Mod: JZ | Performed by: HOSPITALIST

## 2025-05-15 RX ORDER — ALBUTEROL SULFATE 5 MG/ML
2.5 SOLUTION RESPIRATORY (INHALATION)
Status: DISCONTINUED | OUTPATIENT
Start: 2025-05-15 | End: 2025-05-19 | Stop reason: HOSPADM

## 2025-05-15 RX ADMIN — ENOXAPARIN SODIUM 40 MG: 100 INJECTION SUBCUTANEOUS at 17:08

## 2025-05-15 RX ADMIN — SENNOSIDES AND DOCUSATE SODIUM 2 TABLET: 50; 8.6 TABLET ORAL at 17:09

## 2025-05-15 RX ADMIN — POLYETHYLENE GLYCOL 3350 1 PACKET: 17 POWDER, FOR SOLUTION ORAL at 04:12

## 2025-05-15 RX ADMIN — CEFTRIAXONE SODIUM 2000 MG: 10 INJECTION, POWDER, FOR SOLUTION INTRAVENOUS at 18:01

## 2025-05-15 RX ADMIN — IPRATROPIUM BROMIDE AND ALBUTEROL SULFATE 3 ML: .5; 2.5 SOLUTION RESPIRATORY (INHALATION) at 02:43

## 2025-05-15 RX ADMIN — AZITHROMYCIN DIHYDRATE 500 MG: 250 TABLET ORAL at 17:09

## 2025-05-15 RX ADMIN — IPRATROPIUM BROMIDE AND ALBUTEROL SULFATE 3 ML: .5; 2.5 SOLUTION RESPIRATORY (INHALATION) at 07:05

## 2025-05-15 RX ADMIN — FUROSEMIDE 20 MG: 10 INJECTION, SOLUTION INTRAVENOUS at 04:03

## 2025-05-15 RX ADMIN — GUAIFENESIN SYRUP AND DEXTROMETHORPHAN 10 ML: 100; 10 SYRUP ORAL at 04:12

## 2025-05-15 ASSESSMENT — ENCOUNTER SYMPTOMS
MYALGIAS: 0
HEMOPTYSIS: 0
PALPITATIONS: 0
CLAUDICATION: 0
NAUSEA: 0
HEADACHES: 0
DOUBLE VISION: 0
HEARTBURN: 0
VOMITING: 0
WHEEZING: 0
BRUISES/BLEEDS EASILY: 0
DEPRESSION: 0
BLURRED VISION: 0
FEVER: 0
BACK PAIN: 0
CHILLS: 0
COUGH: 0
PND: 0
DIZZINESS: 0

## 2025-05-15 ASSESSMENT — FIBROSIS 4 INDEX
FIB4 SCORE: 0.52
FIB4 SCORE: 0.53

## 2025-05-15 ASSESSMENT — PAIN DESCRIPTION - PAIN TYPE
TYPE: ACUTE PAIN
TYPE: ACUTE PAIN

## 2025-05-15 NOTE — CARE PLAN
The patient is Watcher - Medium risk of patient condition declining or worsening    Shift Goals  Clinical Goals: Wean o2, hemodynamically stable  Patient Goals: comfort, sleep  Family Goals: Communication    Progress made toward(s) clinical / shift goals:        Problem: Knowledge Deficit - Standard  Goal: Patient and family/care givers will demonstrate understanding of plan of care, disease process/condition, diagnostic tests and medications  Outcome: Progressing  Note: Discussed plan of care, pt able to actively participate in the learning process and demonstrates understanding by return demonstration or return explanation. Improved ability to communicate regarding their healthcare and current admission. Improved ability to identify barriers to learning and care.     Problem: Pain - Standard  Goal: Alleviation of pain or a reduction in pain to the patient’s comfort goal  Outcome: Progressing

## 2025-05-15 NOTE — PROGRESS NOTES
Bedside report received from off going RN/tech: Nia, assumed care of patient.     Fall Risk Score: LOW RISK  Fall risk interventions in place: Provide patient/family education based on risk assessment and Educate patient/family to call staff for assistance when getting out of bed  Bed type: Regular (Ashok Score less than 17 interventions in place)  Patient on cardiac monitor: Yes  IVF/IV medications: Not Applicable   Oxygen: How many liters 4L and Traced the line to wall oxygen  Bedside sitter: Not Applicable   Isolation: Not applicable

## 2025-05-15 NOTE — PROGRESS NOTES
4 Eyes Skin Assessment Completed by MARTELL SAGE and MARTELL JAMA.    Head WDL  Ears Redness and Blanching  Nose WDL  Mouth WDL  Neck WDL  Breast/Chest WDL  Shoulder Blades WDL  Spine WDL  (R) Arm/Elbow/Hand WDL  (L) Arm/Elbow/Hand redness on wrist   Abdomen WDL  Groin WDL  Scrotum/Coccyx/Buttocks Redness and Blanching  (R) Leg WDL  (L) Leg WDL  (R) Heel/Foot/Toe Redness and Blanching  (L) Heel/Foot/Toe Redness and Blanching          Devices In Places PIV, NC      Interventions In Place Gray Ear Foams    Possible Skin Injury No    Pictures Uploaded Into Epic Yes  Wound Consult Placed N/A  RN Wound Prevention Protocol Ordered Yes

## 2025-05-15 NOTE — PROGRESS NOTES
..Bedside report received from off going RN/tech: Marisabel, assumed care of patient.     Fall Risk Score: LOW RISK  Fall risk interventions in place: Place yellow fall risk ID band on patient, Provide patient/family education based on risk assessment, and Educate patient/family to call staff for assistance when getting out of bed  Bed type: Regular (Ashok Score less than 17 interventions in place)  Patient on cardiac monitor: Yes  IVF/IV medications: Not Applicable   Oxygen: How many liters 2L  Bedside sitter: NA  Isolation: Not applicable

## 2025-05-15 NOTE — DISCHARGE PLANNING
Case Management Discharge Planning    Admission Date: 5/13/2025  GMLOS: 3.5  ALOS: 2    6-Clicks ADL Score: 24  6-Clicks Mobility Score: 22      Anticipated Discharge Dispo: Discharge Disposition: Discharged to home/self care (01)    DME Needed: Pending hospital course     Action(s) Taken: Pt was requesting assistance with Medicaid. LMSW reached out to [facility] for possible screen of Medicaid.

## 2025-05-15 NOTE — PROGRESS NOTES
Hospital Medicine Daily Progress Note    Date of Service  5/14/2025    Chief Complaint  Cornelio Deluna is a 40 y.o. male admitted 5/13/2025 with shortness of breath    Hospital Course  Cornelio Deluna is a 40 y.o. male with history of bladder disorder, amphetamine abuse, prior history of smoking, who presented 5/13/2025 with complaints of nonproductive cough, shortness of breath for 2 weeks.  He reported subjective chills and fever in the last 2 days.  Denies lower extremity edema or chest pain.  He was seen at urgent care on 5/6 complaining of cough, sore throat, chest pain due to cough, prescribed Medrol pack.  He was then seen at ER on 5/11, COVID-19 and influenza negative, chest x-ray showed no infiltrates.  Was thought that he has viral condition.  Presented tachycardic, tachypneic, blood pressure 203/116, requires 6 L nasal cannula at this time.  CBC showed leukocytosis 15.2.  Pro BNP and troponin are not elevated.  CTA of the chest: No evidence of PE.  Hazy groundglass opacities in bilateral lung bases.  COVID-19 influenza RSV PCR negative.  Treatment included Solu-Medrol.  ERP patient had expiratory wheezes.  On my exam there are no wheezes, but bibasilar Rales    Interval Problem Update  5/14: Patient is new to me today, patient is sitting at the edge of the bed, family is at bedside, he is alert oriented follows commands, trace lower extremity edema, chest x-ray showed cephalization, I reviewed echocardiogram that showed normal EF, CTA chest was negative for PE but showed probably bilateral infiltrates, continue IV antibiotics, continue IV Lasix, discussed with bedside nurse discussed with charge nurse pharmacist.  5/15 patient is resting in bed, he is sitting at the edge of the bed, family is at bedside, he is alert oriented follows commands he is able to speak in full sentences, no fever no chills, discussed regarding results available, discussed with bedside nurse  charge nurse  pharmacist, continue incentive spirometry continue weaning off oxygen    I have discussed this patient's plan of care and discharge plan at IDT rounds today with Case Management, Nursing, Nursing leadership, and other members of the IDT team.    Consultants/Specialty      Code Status  Full Code    Disposition  The patient is not medically cleared for discharge to home or a post-acute facility.      I have placed the appropriate orders for post-discharge needs.    Review of Systems  Review of Systems   Constitutional:  Negative for chills and fever.   Eyes:  Negative for blurred vision and double vision.   Respiratory:  Negative for cough, hemoptysis and wheezing.    Cardiovascular:  Negative for chest pain, palpitations, claudication, leg swelling and PND.   Gastrointestinal:  Negative for heartburn, nausea and vomiting.   Genitourinary:  Negative for hematuria and urgency.   Musculoskeletal:  Negative for back pain and myalgias.   Skin:  Negative for rash.   Neurological:  Negative for dizziness and headaches.   Endo/Heme/Allergies:  Does not bruise/bleed easily.   Psychiatric/Behavioral:  Negative for depression.         Physical Exam  Temp:  [36.5 °C (97.7 °F)-37.2 °C (99 °F)] 36.8 °C (98.2 °F)  Pulse:  [] 71  Resp:  [16-33] 17  BP: (127-203)/() 155/75  SpO2:  [89 %-96 %] 91 %    Physical Exam  Vitals and nursing note reviewed.   Constitutional:       General: He is not in acute distress.     Appearance: Normal appearance.   Eyes:      General:         Right eye: No discharge.         Left eye: No discharge.   Cardiovascular:      Rate and Rhythm: Normal rate and regular rhythm.      Pulses: Normal pulses.      Heart sounds: Normal heart sounds.   Pulmonary:      Effort: Pulmonary effort is normal. No respiratory distress.      Breath sounds: Rales present.   Abdominal:      General: Bowel sounds are normal. There is no distension.      Tenderness: There is no abdominal  tenderness.   Musculoskeletal:         General: Normal range of motion.      Cervical back: Normal range of motion and neck supple.      Right lower leg: Edema present.      Left lower leg: Edema present.   Skin:     General: Skin is warm and dry.      Capillary Refill: Capillary refill takes less than 2 seconds.      Coloration: Skin is not jaundiced.   Neurological:      General: No focal deficit present.      Mental Status: He is alert and oriented to person, place, and time.      Cranial Nerves: No cranial nerve deficit.   Psychiatric:         Mood and Affect: Mood normal.         Behavior: Behavior normal.         Fluids    Intake/Output Summary (Last 24 hours) at 5/14/2025 1547  Last data filed at 5/14/2025 1010  Gross per 24 hour   Intake 240 ml   Output 200 ml   Net 40 ml        Laboratory  Recent Labs     05/13/25 1940 05/14/25  0519   WBC 15.2* 11.9*   RBC 5.13 4.90   HEMOGLOBIN 14.6 14.2   HEMATOCRIT 45.3 43.0   MCV 88.3 87.8   MCH 28.5 29.0   MCHC 32.2* 33.0   RDW 46.7 46.9   PLATELETCT 292 299   MPV 9.3 9.7     Recent Labs     05/13/25 1940 05/14/25  0519   SODIUM 139 137   POTASSIUM 3.9 4.4   CHLORIDE 103 100   CO2 24 27   GLUCOSE 118* 140*   BUN 17 17   CREATININE 0.78 0.81   CALCIUM 9.1 9.1                   Imaging  EC-ECHOCARDIOGRAM COMPLETE W/O CONT   Final Result      CT-CTA CHEST PULMONARY ARTERY W/ RECONS   Final Result      1.  No CT evidence of pulmonary embolism but evaluation is limited due to motion.   2.  Hazy groundglass opacity in the bilateral lung bases, likely atelectasis.         DX-CHEST-PORTABLE (1 VIEW)   Final Result         1. No acute cardiopulmonary abnormalities are identified.           Assessment/Plan  * Acute respiratory failure with hypoxia- (present on admission)  Assessment & Plan  40-year-old male who presented with cough and shortness of breath for 2 weeks, hypoxia on 6 L nasal cannula, tachypnea, tachycardia, leukocytosis WBC 15.2  CTA of the chest: Negative for  PE; bibasilar groundglass opacities that could be atypical infection or edema  COVID-19/influenza/RSV screen negative  Plan: Respiratory PCR panel  Transthoracic echo to evaluate for cardiomyopathy  Ceftriaxone, azithromycin for possible community-acquired pneumonia  IV Lasix 40 mg once  DuoNeb every 4 hours  Pulse ox, wean down oxygen as tolerated      Continue requiring 4 L of oxygen  Respiratory panel negative  CTA negative for PE  Procalcitonin negative  Leukocytosis improving  Continue IV antibiotics for now for possible pneumonia  Echo showed normal EF    Leukocytosis- (present on admission)  Assessment & Plan  Leukocytosis trending down  Procalcitonin is negative    Hyperglycemia- (present on admission)  Assessment & Plan  Check A1c    Hypertensive urgency- (present on admission)  Assessment & Plan  Presented with blood pressure 203/119, spontaneously improved  Plan:  IV Lasix 40 mg once for possible mild fluid overload  Monitor  IV hydralazine as needed      Continue monitoring  Continue Lasix           VTE prophylaxis: Lovenox    I have performed a physical exam and reviewed and updated ROS and Plan today (5/14/2025). In review of yesterday's note (5/13/2025), there are no changes except as documented above.      Total time of 51 minutes spent prepping to see patient (e.g. reviewing  tests/imaging results, notes from consultants, bedside nurse, night shift ) obtaining and/or reviewing separately obtained history. Performing a medically appropriate examination and evaluation.  Counseling and educating the patient.  Ordering medications, tests, or procedures.  Referring and communicating with other health care professionals.  Documenting clinical information in EPIC.  Independently interpreting results and communicating results to patient.  Care coordination.

## 2025-05-16 LAB
ANION GAP SERPL CALC-SCNC: 11 MMOL/L (ref 7–16)
BUN SERPL-MCNC: 17 MG/DL (ref 8–22)
CALCIUM SERPL-MCNC: 8.7 MG/DL (ref 8.5–10.5)
CHLORIDE SERPL-SCNC: 99 MMOL/L (ref 96–112)
CO2 SERPL-SCNC: 28 MMOL/L (ref 20–33)
CREAT SERPL-MCNC: 0.9 MG/DL (ref 0.5–1.4)
ERYTHROCYTE [DISTWIDTH] IN BLOOD BY AUTOMATED COUNT: 46.3 FL (ref 35.9–50)
GFR SERPLBLD CREATININE-BSD FMLA CKD-EPI: 111 ML/MIN/1.73 M 2
GLUCOSE SERPL-MCNC: 94 MG/DL (ref 65–99)
HCT VFR BLD AUTO: 42.1 % (ref 42–52)
HGB BLD-MCNC: 14 G/DL (ref 14–18)
MCH RBC QN AUTO: 29.6 PG (ref 27–33)
MCHC RBC AUTO-ENTMCNC: 33.3 G/DL (ref 32.3–36.5)
MCV RBC AUTO: 89 FL (ref 81.4–97.8)
PLATELET # BLD AUTO: 292 K/UL (ref 164–446)
PMV BLD AUTO: 9.3 FL (ref 9–12.9)
POTASSIUM SERPL-SCNC: 4.1 MMOL/L (ref 3.6–5.5)
RBC # BLD AUTO: 4.73 M/UL (ref 4.7–6.1)
SODIUM SERPL-SCNC: 138 MMOL/L (ref 135–145)
WBC # BLD AUTO: 10.3 K/UL (ref 4.8–10.8)

## 2025-05-16 PROCEDURE — 700105 HCHG RX REV CODE 258: Performed by: INTERNAL MEDICINE

## 2025-05-16 PROCEDURE — 80048 BASIC METABOLIC PNL TOTAL CA: CPT

## 2025-05-16 PROCEDURE — 99233 SBSQ HOSP IP/OBS HIGH 50: CPT | Performed by: STUDENT IN AN ORGANIZED HEALTH CARE EDUCATION/TRAINING PROGRAM

## 2025-05-16 PROCEDURE — 700111 HCHG RX REV CODE 636 W/ 250 OVERRIDE (IP): Mod: JZ | Performed by: STUDENT IN AN ORGANIZED HEALTH CARE EDUCATION/TRAINING PROGRAM

## 2025-05-16 PROCEDURE — 99222 1ST HOSP IP/OBS MODERATE 55: CPT | Performed by: INTERNAL MEDICINE

## 2025-05-16 PROCEDURE — 85027 COMPLETE CBC AUTOMATED: CPT

## 2025-05-16 PROCEDURE — 770006 HCHG ROOM/CARE - MED/SURG/GYN SEMI*

## 2025-05-16 PROCEDURE — 36415 COLL VENOUS BLD VENIPUNCTURE: CPT

## 2025-05-16 PROCEDURE — 700111 HCHG RX REV CODE 636 W/ 250 OVERRIDE (IP): Performed by: INTERNAL MEDICINE

## 2025-05-16 RX ORDER — METHYLPREDNISOLONE SODIUM SUCCINATE 125 MG/2ML
125 INJECTION, POWDER, LYOPHILIZED, FOR SOLUTION INTRAMUSCULAR; INTRAVENOUS ONCE
Status: COMPLETED | OUTPATIENT
Start: 2025-05-16 | End: 2025-05-16

## 2025-05-16 RX ADMIN — METHYLPREDNISOLONE SODIUM SUCCINATE 125 MG: 125 INJECTION INTRAMUSCULAR; INTRAVENOUS at 16:46

## 2025-05-16 RX ADMIN — ENOXAPARIN SODIUM 40 MG: 100 INJECTION SUBCUTANEOUS at 16:47

## 2025-05-16 RX ADMIN — CEFTRIAXONE SODIUM 2000 MG: 10 INJECTION, POWDER, FOR SOLUTION INTRAVENOUS at 16:47

## 2025-05-16 ASSESSMENT — COGNITIVE AND FUNCTIONAL STATUS - GENERAL
SUGGESTED CMS G CODE MODIFIER DAILY ACTIVITY: CH
SUGGESTED CMS G CODE MODIFIER MOBILITY: CI
MOBILITY SCORE: 23
DAILY ACTIVITIY SCORE: 24
CLIMB 3 TO 5 STEPS WITH RAILING: A LITTLE

## 2025-05-16 ASSESSMENT — ENCOUNTER SYMPTOMS
PND: 0
CHILLS: 0
DEPRESSION: 0
PSYCHIATRIC NEGATIVE: 1
CARDIOVASCULAR NEGATIVE: 1
CLAUDICATION: 0
NEUROLOGICAL NEGATIVE: 1
BLURRED VISION: 0
COUGH: 0
BRUISES/BLEEDS EASILY: 0
HEADACHES: 0
PALPITATIONS: 0
FEVER: 0
EYES NEGATIVE: 1
GASTROINTESTINAL NEGATIVE: 1
MUSCULOSKELETAL NEGATIVE: 1
MYALGIAS: 0
NAUSEA: 0
HEMOPTYSIS: 0
WHEEZING: 0
BACK PAIN: 0
DOUBLE VISION: 0
DIZZINESS: 0
SHORTNESS OF BREATH: 1
VOMITING: 0
HEARTBURN: 0
CONSTITUTIONAL NEGATIVE: 1

## 2025-05-16 ASSESSMENT — PAIN DESCRIPTION - PAIN TYPE
TYPE: ACUTE PAIN
TYPE: ACUTE PAIN

## 2025-05-16 NOTE — CARE PLAN
The patient is Stable - Low risk of patient condition declining or worsening    Shift Goals  Clinical Goals: titrate O2, stable VS and rest  Patient Goals: rest  Family Goals: updates    Progress made toward(s) clinical / shift goals:    Problem: Knowledge Deficit - Standard  Goal: Patient and family/care givers will demonstrate understanding of plan of care, disease process/condition, diagnostic tests and medications  Description: Target End Date:  1-3 days or as soon as patient condition allowsDocument in Patient Education1.  Patient and family/caregiver oriented to unit, equipment, visitation policy and means for communicating concern2.  Complete/review Learning Assessment3.  Assess knowledge level of disease process/condition, treatment plan, diagnostic tests and medications4.  Explain disease process/condition, treatment plan, diagnostic tests and medications  5/16/2025 0154 by Anna Greene R.N.  Outcome: Progressing  5/16/2025 0153 by SAMUEL HollandN.  Outcome: Progressing     Problem: Pain - Standard  Goal: Alleviation of pain or a reduction in pain to the patient’s comfort goal  Description: Target End Date:  Prior to discharge or change in level of careDocument on Vitals flowsheet1.  Document pain using the appropriate pain scale per order or unit policy2.  Educate and implement non-pharmacologic comfort measures (i.e. relaxation, distraction, massage, cold/heat therapy, etc.)3.  Pain management medications as ordered4.  Reassess pain after pain med administration per policy5.  If opiods administered assess patient's response to pain medication is appropriate per POSS sedation scale6.  Follow pain management plan developed in collaboration with patient and interdisciplinary team (including palliative care or pain specialists if applicable)  5/16/2025 0154 by Anna Greene, R.N.  Outcome: Progressing  5/16/2025 0153 by Anna Greene RLauriN.  Outcome: Progressing     Problem: Respiratory  Goal:  Patient will achieve/maintain optimum respiratory ventilation and gas exchange  Description: Target End Date:  Prior to discharge or change in level of careDocument on Assessment flowsheet1.  Assess and monitor rate, rhythm, depth and effort of respiration2.  Breath sounds assessed qshift and/or as needed3.  Assess O2 saturation, administer/titrate oxygen as ordered4.  Position patient for maximum ventilatory efficiency5.  Turn, cough, and deep breath with splinting to improve effectiveness6.  Collaborate with RT to administer medication/treatments per order7.  Encourage use of incentive spirometer and encourage patient to cough after use and utilize splinting techniques if applicable8.  Airway suctioning9.  Monitor sputum production for changes in color, consistency and [id number]. Perform frequent oral cicegio09. Alternate physical activity with rest periods  Outcome: Progressing, titrated down to 2L O2     Problem: Infection - Standard  Goal: Patient will remain free from infection  Description: Target End Date:  Prior to discharge or change in level of care1.  Utilize Standard Precautions at all times to reduce the risk of transmission of microorganisms from both recognized andunrecognized sources of infection2.  Infection prevention handouts provided (general/device/diagnosis specific) and documented in Patient Education3.  Educate patient and family/caregiver on isolation precautions if applicable  Outcome: Progressing, wbcs trending down       Patient is not progressing towards the following goals:

## 2025-05-16 NOTE — PROGRESS NOTES
Assumed care of patient at bedside report from day RN. Updated on POC. Patient currently A & O x 4; on 4 L O2 via NC; up self with no current complaints of acute pain. Assessment completed.  Call light within reach. Whiteboard updated. Fall precautions in place. Bed locked and in lowest position. All questions answered. No other needs indicated at this time.

## 2025-05-16 NOTE — PROGRESS NOTES
Hospital Medicine Daily Progress Note    Date of Service  5/16/2025    Chief Complaint  Cornelio Deluna is a 40 y.o. male admitted 5/13/2025 with shortness of breath    Hospital Course  Cornelio Deluna is a 40 y.o. male with history of bladder disorder, amphetamine abuse, prior history of smoking, who presented 5/13/2025 with complaints of nonproductive cough, shortness of breath for 2 weeks.  He reported subjective chills and fever in the last 2 days.  Denies lower extremity edema or chest pain.  He was seen at urgent care on 5/6 complaining of cough, sore throat, chest pain due to cough, prescribed Medrol pack.  He was then seen at ER on 5/11, COVID-19 and influenza negative, chest x-ray showed no infiltrates.  Was thought that he has viral condition.  Presented tachycardic, tachypneic, blood pressure 203/116, requires 6 L nasal cannula at this time.  CBC showed leukocytosis 15.2.  Pro BNP and troponin are not elevated.  CTA of the chest: No evidence of PE.  Hazy groundglass opacities in bilateral lung bases.  COVID-19 influenza RSV PCR negative.  Treatment included Solu-Medrol.  ERP patient had expiratory wheezes.  On my exam there are no wheezes, but bibasilar Rales    Interval Problem Update  5/14: Patient is new to me today, patient is sitting at the edge of the bed, family is at bedside, he is alert oriented follows commands, trace lower extremity edema, chest x-ray showed cephalization, I reviewed echocardiogram that showed normal EF, CTA chest was negative for PE but showed probably bilateral infiltrates, continue IV antibiotics, continue IV Lasix, discussed with bedside nurse discussed with charge nurse pharmacist.  5/15 patient is resting in bed, he is sitting at the edge of the bed, family is at bedside, he is alert oriented follows commands he is able to speak in full sentences, no fever no chills, discussed regarding results available, discussed with bedside nurse  charge nurse  pharmacist, continue incentive spirometry continue weaning off oxygen    5/16  No acute events overnight.  Patient still feels short of breath but improved from prior.  Reports his breathing gets worse overnight or when he lies flat.  Still needs up to 6 L oxygen with activity.  White count improving.  Other labs within normal limits.  Continue ceftriaxone, Lasix.  Add steroids today to monitor response.    I have discussed this patient's plan of care and discharge plan at IDT rounds today with Case Management, Nursing, Nursing leadership, and other members of the IDT team.    Consultants/Specialty      Code Status  Full Code    Disposition  The patient is not medically cleared for discharge to home or a post-acute facility.  Anticipate discharge to: home with close outpatient follow-up    I have placed the appropriate orders for post-discharge needs.    Review of Systems  Review of Systems   Constitutional:  Negative for chills and fever.   Eyes:  Negative for blurred vision and double vision.   Respiratory:  Positive for shortness of breath. Negative for cough, hemoptysis and wheezing.    Cardiovascular:  Negative for chest pain, palpitations, claudication, leg swelling and PND.   Gastrointestinal:  Negative for heartburn, nausea and vomiting.   Genitourinary:  Negative for hematuria and urgency.   Musculoskeletal:  Negative for back pain and myalgias.   Skin:  Negative for rash.   Neurological:  Negative for dizziness and headaches.   Endo/Heme/Allergies:  Does not bruise/bleed easily.   Psychiatric/Behavioral:  Negative for depression.         Physical Exam  Temp:  [36.6 °C (97.8 °F)-37.3 °C (99.2 °F)] 36.7 °C (98 °F)  Pulse:  [60-82] 60  Resp:  [16-20] 19  BP: (105-119)/(58-67) 105/58  SpO2:  [92 %-96 %] 92 %    Physical Exam  Vitals and nursing note reviewed.   Constitutional:       General: He is not in acute distress.     Appearance: Normal appearance.   Eyes:      General:          Right eye: No discharge.         Left eye: No discharge.   Cardiovascular:      Rate and Rhythm: Normal rate and regular rhythm.      Pulses: Normal pulses.      Heart sounds: Normal heart sounds.   Pulmonary:      Effort: Pulmonary effort is normal. No respiratory distress.      Breath sounds: Rales present.   Abdominal:      General: Bowel sounds are normal. There is no distension.      Tenderness: There is no abdominal tenderness.   Musculoskeletal:         General: Normal range of motion.      Cervical back: Normal range of motion and neck supple.      Right lower leg: Edema present.      Left lower leg: Edema present.   Skin:     General: Skin is warm and dry.      Capillary Refill: Capillary refill takes less than 2 seconds.      Coloration: Skin is not jaundiced.   Neurological:      General: No focal deficit present.      Mental Status: He is alert and oriented to person, place, and time.      Cranial Nerves: No cranial nerve deficit.   Psychiatric:         Mood and Affect: Mood normal.         Behavior: Behavior normal.         Fluids    Intake/Output Summary (Last 24 hours) at 5/16/2025 1515  Last data filed at 5/16/2025 0900  Gross per 24 hour   Intake 790 ml   Output --   Net 790 ml        Laboratory  Recent Labs     05/14/25  0519 05/15/25  0259 05/16/25  0107   WBC 11.9* 12.8* 10.3   RBC 4.90 4.67* 4.73   HEMOGLOBIN 14.2 13.6* 14.0   HEMATOCRIT 43.0 41.6* 42.1   MCV 87.8 89.1 89.0   MCH 29.0 29.1 29.6   MCHC 33.0 32.7 33.3   RDW 46.9 47.8 46.3   PLATELETCT 299 296 292   MPV 9.7 9.8 9.3     Recent Labs     05/14/25  0519 05/15/25  0259 05/16/25  0107   SODIUM 137 136 138   POTASSIUM 4.4 4.0 4.1   CHLORIDE 100 98 99   CO2 27 31 28   GLUCOSE 140* 92 94   BUN 17 18 17   CREATININE 0.81 0.80 0.90   CALCIUM 9.1 8.9 8.7                   Imaging  EC-ECHOCARDIOGRAM COMPLETE W/O CONT   Final Result      CT-CTA CHEST PULMONARY ARTERY W/ RECONS   Final Result      1.  No CT evidence of pulmonary embolism but  evaluation is limited due to motion.   2.  Hazy groundglass opacity in the bilateral lung bases, likely atelectasis.         DX-CHEST-PORTABLE (1 VIEW)   Final Result         1. No acute cardiopulmonary abnormalities are identified.           Assessment/Plan  * Acute respiratory failure with hypoxia- (present on admission)  Assessment & Plan  -Presented with cough and shortness of breath for 2 weeks, hypoxia on 6 L nasal cannula, tachypnea, tachycardia, leukocytosis WBC 15.2  -CTA of the chest: Negative for PE; bibasilar groundglass opacities that could be atypical infection or edema  -COVID-19/influenza/RSV screen negative. Respiratory PCR panel negative.  -ECHO with normal EF and no wall motion abnormalities.  -Continue ceftriaxone, completed azithromycin for possible community-acquired pneumonia  -DuoNeb every 4 hours  -5/16: Will trial a dose of IV Solu-Medrol 125 mg once and monitor response.  -If no improvement in oxygenation, we will consider calling pulmonology.  -Continuous pulse oximetry.  Maintain O2 saturations > 92%.      Leukocytosis- (present on admission)  Assessment & Plan  -Leukocytosis trending down  -Procalcitonin is negative    Hyperglycemia- (present on admission)  Assessment & Plan  -A1c 6    Hypertensive urgency- (present on admission)  Assessment & Plan  -Presented with blood pressure 203/119, spontaneously improved  -IV Lasix 40 mg once for possible mild fluid overload  -IV hydralazine as needed             VTE prophylaxis: Lovenox    I have performed a physical exam and reviewed and updated ROS and Plan today (5/16/2025). In review of yesterday's note (5/15/2025), there are no changes except as documented above.      Total time of 51 minutes spent prepping to see patient (e.g. reviewing  tests/imaging results, notes from consultants, bedside nurse, night shift ) obtaining and/or reviewing separately obtained history. Performing a medically appropriate examination and evaluation.   Counseling and educating the patient.  Ordering medications, tests, or procedures.  Referring and communicating with other health care professionals.  Documenting clinical information in EPIC.  Independently interpreting results and communicating results to patient.  Care coordination.

## 2025-05-16 NOTE — CARE PLAN
Pt AO x 4.  Pt denies pain during initial assessment.  Call light and belongings within reach.  Bed locked and in lowest position.  Hourly rounding.  Needs currently met.             The patient is Watcher - Medium risk of patient condition declining or worsening    Shift Goals  Clinical Goals: titrate O2, stable VS and rest  Patient Goals: rest  Family Goals: updates    Progress made toward(s) clinical / shift goals:      Problem: Knowledge Deficit - Standard  Goal: Patient and family/care givers will demonstrate understanding of plan of care, disease process/condition, diagnostic tests and medications  Outcome: Progressing     Problem: Pain - Standard  Goal: Alleviation of pain or a reduction in pain to the patient’s comfort goal  Outcome: Progressing     Problem: Infection - Standard  Goal: Patient will remain free from infection  Outcome: Progressing       Patient is not progressing towards the following goals:      Problem: Respiratory  Goal: Patient will achieve/maintain optimum respiratory ventilation and gas exchange  Outcome: Not Progressing

## 2025-05-17 LAB
ALBUMIN SERPL BCP-MCNC: 3.7 G/DL (ref 3.2–4.9)
ALBUMIN/GLOB SERPL: 0.9 G/DL
ALP SERPL-CCNC: 93 U/L (ref 30–99)
ALT SERPL-CCNC: 54 U/L (ref 2–50)
ANION GAP SERPL CALC-SCNC: 11 MMOL/L (ref 7–16)
AST SERPL-CCNC: 28 U/L (ref 12–45)
BASOPHILS # BLD AUTO: 0 % (ref 0–1.8)
BASOPHILS # BLD: 0 K/UL (ref 0–0.12)
BILIRUB SERPL-MCNC: 0.3 MG/DL (ref 0.1–1.5)
BUN SERPL-MCNC: 14 MG/DL (ref 8–22)
CALCIUM ALBUM COR SERPL-MCNC: 9.4 MG/DL (ref 8.5–10.5)
CALCIUM SERPL-MCNC: 9.2 MG/DL (ref 8.5–10.5)
CHLORIDE SERPL-SCNC: 98 MMOL/L (ref 96–112)
CO2 SERPL-SCNC: 26 MMOL/L (ref 20–33)
CREAT SERPL-MCNC: 0.82 MG/DL (ref 0.5–1.4)
EOSINOPHIL # BLD AUTO: 0 K/UL (ref 0–0.51)
EOSINOPHIL NFR BLD: 0 % (ref 0–6.9)
ERYTHROCYTE [DISTWIDTH] IN BLOOD BY AUTOMATED COUNT: 45.3 FL (ref 35.9–50)
GFR SERPLBLD CREATININE-BSD FMLA CKD-EPI: 115 ML/MIN/1.73 M 2
GLOBULIN SER CALC-MCNC: 4.3 G/DL (ref 1.9–3.5)
GLUCOSE SERPL-MCNC: 170 MG/DL (ref 65–99)
HCT VFR BLD AUTO: 44.5 % (ref 42–52)
HGB BLD-MCNC: 14.7 G/DL (ref 14–18)
LYMPHOCYTES # BLD AUTO: 1.15 K/UL (ref 1–4.8)
LYMPHOCYTES NFR BLD: 13.9 % (ref 22–41)
MANUAL DIFF BLD: NORMAL
MCH RBC QN AUTO: 29.1 PG (ref 27–33)
MCHC RBC AUTO-ENTMCNC: 33 G/DL (ref 32.3–36.5)
MCV RBC AUTO: 88.1 FL (ref 81.4–97.8)
METAMYELOCYTES NFR BLD MANUAL: 0.9 %
MICROCYTES BLD QL SMEAR: ABNORMAL
MONOCYTES # BLD AUTO: 0 K/UL (ref 0–0.85)
MONOCYTES NFR BLD AUTO: 0 % (ref 0–13.4)
MORPHOLOGY BLD-IMP: NORMAL
NEUTROPHILS # BLD AUTO: 7.07 K/UL (ref 1.82–7.42)
NEUTROPHILS NFR BLD: 85.2 % (ref 44–72)
NRBC # BLD AUTO: 0 K/UL
NRBC BLD-RTO: 0 /100 WBC (ref 0–0.2)
PLATELET # BLD AUTO: 303 K/UL (ref 164–446)
PLATELET BLD QL SMEAR: NORMAL
PMV BLD AUTO: 9.2 FL (ref 9–12.9)
POTASSIUM SERPL-SCNC: 4.4 MMOL/L (ref 3.6–5.5)
PROT SERPL-MCNC: 8 G/DL (ref 6–8.2)
RBC # BLD AUTO: 5.05 M/UL (ref 4.7–6.1)
RBC BLD AUTO: PRESENT
SODIUM SERPL-SCNC: 135 MMOL/L (ref 135–145)
WBC # BLD AUTO: 8.3 K/UL (ref 4.8–10.8)

## 2025-05-17 PROCEDURE — 80053 COMPREHEN METABOLIC PANEL: CPT

## 2025-05-17 PROCEDURE — 700102 HCHG RX REV CODE 250 W/ 637 OVERRIDE(OP): Performed by: NURSE PRACTITIONER

## 2025-05-17 PROCEDURE — A9270 NON-COVERED ITEM OR SERVICE: HCPCS | Performed by: INTERNAL MEDICINE

## 2025-05-17 PROCEDURE — 700111 HCHG RX REV CODE 636 W/ 250 OVERRIDE (IP): Performed by: INTERNAL MEDICINE

## 2025-05-17 PROCEDURE — 85027 COMPLETE CBC AUTOMATED: CPT

## 2025-05-17 PROCEDURE — 700105 HCHG RX REV CODE 258: Performed by: INTERNAL MEDICINE

## 2025-05-17 PROCEDURE — 700102 HCHG RX REV CODE 250 W/ 637 OVERRIDE(OP): Performed by: INTERNAL MEDICINE

## 2025-05-17 PROCEDURE — A9270 NON-COVERED ITEM OR SERVICE: HCPCS | Performed by: NURSE PRACTITIONER

## 2025-05-17 PROCEDURE — 700111 HCHG RX REV CODE 636 W/ 250 OVERRIDE (IP): Performed by: STUDENT IN AN ORGANIZED HEALTH CARE EDUCATION/TRAINING PROGRAM

## 2025-05-17 PROCEDURE — 770006 HCHG ROOM/CARE - MED/SURG/GYN SEMI*

## 2025-05-17 PROCEDURE — 99233 SBSQ HOSP IP/OBS HIGH 50: CPT | Performed by: STUDENT IN AN ORGANIZED HEALTH CARE EDUCATION/TRAINING PROGRAM

## 2025-05-17 PROCEDURE — 36415 COLL VENOUS BLD VENIPUNCTURE: CPT

## 2025-05-17 PROCEDURE — 85007 BL SMEAR W/DIFF WBC COUNT: CPT

## 2025-05-17 RX ORDER — TRAZODONE HYDROCHLORIDE 50 MG/1
50 TABLET ORAL ONCE
Status: DISCONTINUED | OUTPATIENT
Start: 2025-05-17 | End: 2025-05-17

## 2025-05-17 RX ORDER — FUROSEMIDE 10 MG/ML
40 INJECTION INTRAMUSCULAR; INTRAVENOUS ONCE
Status: COMPLETED | OUTPATIENT
Start: 2025-05-17 | End: 2025-05-17

## 2025-05-17 RX ORDER — PREDNISONE 20 MG/1
40 TABLET ORAL DAILY
Status: DISCONTINUED | OUTPATIENT
Start: 2025-05-17 | End: 2025-05-19 | Stop reason: HOSPADM

## 2025-05-17 RX ORDER — TRAZODONE HYDROCHLORIDE 50 MG/1
50 TABLET ORAL
Status: DISCONTINUED | OUTPATIENT
Start: 2025-05-17 | End: 2025-05-19 | Stop reason: HOSPADM

## 2025-05-17 RX ADMIN — SENNOSIDES AND DOCUSATE SODIUM 2 TABLET: 50; 8.6 TABLET ORAL at 17:15

## 2025-05-17 RX ADMIN — ENOXAPARIN SODIUM 40 MG: 100 INJECTION SUBCUTANEOUS at 17:15

## 2025-05-17 RX ADMIN — CEFTRIAXONE SODIUM 2000 MG: 10 INJECTION, POWDER, FOR SOLUTION INTRAVENOUS at 17:15

## 2025-05-17 RX ADMIN — TRAZODONE HYDROCHLORIDE 50 MG: 50 TABLET ORAL at 00:10

## 2025-05-17 RX ADMIN — FUROSEMIDE 40 MG: 10 INJECTION, SOLUTION INTRAVENOUS at 09:39

## 2025-05-17 RX ADMIN — ACETAMINOPHEN 650 MG: 325 TABLET ORAL at 16:13

## 2025-05-17 RX ADMIN — PREDNISONE 40 MG: 20 TABLET ORAL at 09:39

## 2025-05-17 ASSESSMENT — ENCOUNTER SYMPTOMS
BRUISES/BLEEDS EASILY: 0
VOMITING: 0
SHORTNESS OF BREATH: 1
FEVER: 0
PALPITATIONS: 0
CLAUDICATION: 0
MYALGIAS: 0
HEMOPTYSIS: 0
CHILLS: 0
HEADACHES: 0
NAUSEA: 0
HEARTBURN: 0
COUGH: 0
WHEEZING: 0
DOUBLE VISION: 0
BLURRED VISION: 0
DIZZINESS: 0
DEPRESSION: 0
PND: 0
BACK PAIN: 0

## 2025-05-17 ASSESSMENT — PAIN DESCRIPTION - PAIN TYPE: TYPE: ACUTE PAIN

## 2025-05-17 NOTE — CARE PLAN
The patient is Watcher - Medium risk of patient condition declining or worsening    Shift Goals  Clinical Goals: Maintain O2 sat. 92% or more  Patient Goals: rest and sleep  Family Goals: updates    Progress made toward(s) clinical / shift goals: Patient is on 3.5lpm nc at 92% O2 sat., (+) mild productive cough. No sx/sy of respiratory distress noted.     Patient is not progressing towards the following goals: n/a

## 2025-05-17 NOTE — CARE PLAN
Pt AO x 4.  Pt denies pain during initial assessment.  Call light and belongings within reach.  Bed locked and in lowest position.  Hourly rounding.  Needs currently met.             The patient is Stable - Low risk of patient condition declining or worsening    Shift Goals  Clinical Goals: Maintain O2 sat. 92% or more  Patient Goals: rest and sleep  Family Goals: updates    Progress made toward(s) clinical / shift goals:      Problem: Knowledge Deficit - Standard  Goal: Patient and family/care givers will demonstrate understanding of plan of care, disease process/condition, diagnostic tests and medications  Outcome: Progressing     Problem: Pain - Standard  Goal: Alleviation of pain or a reduction in pain to the patient’s comfort goal  Outcome: Progressing     Problem: Respiratory  Goal: Patient will achieve/maintain optimum respiratory ventilation and gas exchange  Outcome: Progressing     Problem: Infection - Standard  Goal: Patient will remain free from infection  Outcome: Progressing       Patient is not progressing towards the following goals:

## 2025-05-17 NOTE — CONSULTS
Pulmonary Consultation    Date of consult: 5/16/2025    Referring Physician  Umer Cage M.D.    Reason for Consultation  Hypoxic respiratory failure    History of Presenting Illness  40 y.o. male who presented 5/13/2025 with sob and found to have hypoxic respiratory failure  Previous hx of amphetamine use  Pt over 2 weeks progressive sob  ad dry cough  Seen I Urgent care 5/6 given medrol dose pack viral panel negative  Then admitted with tachycardio KEISHA 203/116 and needing 6 l O2  ECHO nl with normal RVSP  CTA no PE but patch areas of GG  No orthopnea  No LE edema  Never smoker  No pulmonary problems as a child  Works as a     Code Status  Full Code    Review of Systems  Review of Systems   Constitutional: Negative.    HENT: Negative.     Eyes: Negative.    Respiratory:  Positive for shortness of breath.         Mainly with exertion   Cardiovascular: Negative.    Gastrointestinal: Negative.    Genitourinary: Negative.    Musculoskeletal: Negative.    Skin: Negative.    Neurological: Negative.    Endo/Heme/Allergies: Negative.    Psychiatric/Behavioral: Negative.         Past Medical History   has a past medical history of Urinary bladder disorder.    Surgical History   has a past surgical history that includes other; pr cystourethroscopy (N/A, 5/26/2023); and urethrotomy internal (N/A, 5/26/2023).    Family History  family history is not on file.    Social History   reports that he has never smoked. He has never used smokeless tobacco. He reports that he does not currently use alcohol. He reports that he does not use drugs.    Medications  Home Medications       Reviewed by Zina Martinez (Pharmacy Tech) on 05/13/25 at 2251  Med List Status: Complete     Medication Last Dose Status        Patient Dinesh Taking any Medications                         Audit from Redirected Encounters    **Home medications have not yet been reviewed for this encounter**       Current  Medications[1]    Allergies  Allergies[2]    Vital Signs last 24 hours  Temp:  [36.4 °C (97.5 °F)-36.7 °C (98 °F)] 36.4 °C (97.5 °F)  Pulse:  [60-81] 81  Resp:  [16-20] 17  BP: (105-129)/(58-82) 115/73  SpO2:  [90 %-96 %] 92 %    Physical Exam  Physical Exam  Constitutional:       Appearance: He is obese.   HENT:      Head: Normocephalic and atraumatic.      Mouth/Throat:      Mouth: Mucous membranes are moist.   Eyes:      Extraocular Movements: Extraocular movements intact.      Pupils: Pupils are equal, round, and reactive to light.   Cardiovascular:      Rate and Rhythm: Normal rate.   Pulmonary:      Breath sounds: No wheezing.   Musculoskeletal:         General: Normal range of motion.      Cervical back: Normal range of motion.   Skin:     General: Skin is warm and dry.   Neurological:      General: No focal deficit present.      Mental Status: He is alert and oriented to person, place, and time.   Psychiatric:         Mood and Affect: Mood normal.         Behavior: Behavior normal.         Thought Content: Thought content normal.         Judgment: Judgment normal.         Fluids    Intake/Output Summary (Last 24 hours) at 5/16/2025 2113  Last data filed at 5/16/2025 1924  Gross per 24 hour   Intake 890 ml   Output --   Net 890 ml       Laboratory  Recent Results (from the past 48 hours)   CBC WITHOUT DIFFERENTIAL    Collection Time: 05/15/25  2:59 AM   Result Value Ref Range    WBC 12.8 (H) 4.8 - 10.8 K/uL    RBC 4.67 (L) 4.70 - 6.10 M/uL    Hemoglobin 13.6 (L) 14.0 - 18.0 g/dL    Hematocrit 41.6 (L) 42.0 - 52.0 %    MCV 89.1 81.4 - 97.8 fL    MCH 29.1 27.0 - 33.0 pg    MCHC 32.7 32.3 - 36.5 g/dL    RDW 47.8 35.9 - 50.0 fL    Platelet Count 296 164 - 446 K/uL    MPV 9.8 9.0 - 12.9 fL   Basic Metabolic Panel    Collection Time: 05/15/25  2:59 AM   Result Value Ref Range    Sodium 136 135 - 145 mmol/L    Potassium 4.0 3.6 - 5.5 mmol/L    Chloride 98 96 - 112 mmol/L    Co2 31 20 - 33 mmol/L    Glucose 92 65 -  99 mg/dL    Bun 18 8 - 22 mg/dL    Creatinine 0.80 0.50 - 1.40 mg/dL    Calcium 8.9 8.5 - 10.5 mg/dL    Anion Gap 7.0 7.0 - 16.0   MAGNESIUM    Collection Time: 05/15/25  2:59 AM   Result Value Ref Range    Magnesium 2.4 1.5 - 2.5 mg/dL   ESTIMATED GFR    Collection Time: 05/15/25  2:59 AM   Result Value Ref Range    GFR (CKD-EPI) 115 >60 mL/min/1.73 m 2   CBC WITHOUT DIFFERENTIAL    Collection Time: 05/16/25  1:07 AM   Result Value Ref Range    WBC 10.3 4.8 - 10.8 K/uL    RBC 4.73 4.70 - 6.10 M/uL    Hemoglobin 14.0 14.0 - 18.0 g/dL    Hematocrit 42.1 42.0 - 52.0 %    MCV 89.0 81.4 - 97.8 fL    MCH 29.6 27.0 - 33.0 pg    MCHC 33.3 32.3 - 36.5 g/dL    RDW 46.3 35.9 - 50.0 fL    Platelet Count 292 164 - 446 K/uL    MPV 9.3 9.0 - 12.9 fL   Basic Metabolic Panel    Collection Time: 05/16/25  1:07 AM   Result Value Ref Range    Sodium 138 135 - 145 mmol/L    Potassium 4.1 3.6 - 5.5 mmol/L    Chloride 99 96 - 112 mmol/L    Co2 28 20 - 33 mmol/L    Glucose 94 65 - 99 mg/dL    Bun 17 8 - 22 mg/dL    Creatinine 0.90 0.50 - 1.40 mg/dL    Calcium 8.7 8.5 - 10.5 mg/dL    Anion Gap 11.0 7.0 - 16.0   ESTIMATED GFR    Collection Time: 05/16/25  1:07 AM   Result Value Ref Range    GFR (CKD-EPI) 111 >60 mL/min/1.73 m 2       Imaging  CT scan 5/13/2025 shows patch perhilar GG suggesting edema    Assessment/Plan  #Hypoxic respiratory failure  Do think he has a chronic component  Surprised to see the RVSP as normal  High suspicious of JOVANI   Also his HCO3 is elevated suggesting he may also have a component of obesity hypoventilation syndrome   Thus with the marked elevated BP he may have presented with pulm edema but now his o2 needs likely due to group III pulmonary hypertension and may need to have a right heart cath  Not sure steroids would help but we can see how he fares    Recommend  Out pt sleep study eith end tidal CO2  PFTs   +/- right heart cath    Will arrange outpt follow up      Discussed patient condition and risk of  morbidity and/or mortality with Patient, his son             [1]   Current Facility-Administered Medications   Medication Dose Route Frequency Provider Last Rate Last Admin    albuterol (Proventil) 2.5mg/0.5ml nebulizer solution 2.5 mg  2.5 mg Nebulization Q2HRS PRN (RT) Adryan Wisdom M.D.        Respiratory Therapy Consult   Nebulization Continuous RT Magen Paula M.D.        enoxaparin (Lovenox) inj 40 mg  40 mg Subcutaneous DAILY AT 1800 Magen Paula M.D.   40 mg at 05/16/25 1647    acetaminophen (Tylenol) tablet 650 mg  650 mg Oral Q6HRS PRN Magen Paula M.D.   650 mg at 05/14/25 1158    oxyCODONE immediate-release (Roxicodone) tablet 2.5 mg  2.5 mg Oral Q3HRS PRN Magen Paula M.D.   2.5 mg at 05/14/25 0735    Or    oxyCODONE immediate-release (Roxicodone) tablet 5 mg  5 mg Oral Q3HRS PRN Magen Palua M.D.        Or    HYDROmorphone (Dilaudid) injection 0.25 mg  0.25 mg Intravenous Q3HRS PRN Magen Paula M.D.        senna-docusate (Pericolace Or Senokot S) 8.6-50 MG per tablet 2 Tablet  2 Tablet Oral Q EVENING Magen Paula M.D.   2 Tablet at 05/15/25 1709    And    polyethylene glycol/lytes (Miralax) Packet 1 Packet  1 Packet Oral QDAY PRN Magen Paula M.D.   1 Packet at 05/15/25 0412    hydrALAZINE (Apresoline) injection 10 mg  10 mg Intravenous Q4HRS PRN Magen Paula M.D.        ondansetron (Zofran) syringe/vial injection 4 mg  4 mg Intravenous Q4HRS PRN Magen Paula M.D.   4 mg at 05/13/25 2310    ondansetron (Zofran ODT) dispertab 4 mg  4 mg Oral Q4HRS PRN Magen Paula, M.D.        promethazine (Phenergan) tablet 12.5-25 mg  12.5-25 mg Oral Q4HRS ROBERT Paula M.D.        promethazine (Phenergan) suppository 12.5-25 mg  12.5-25 mg Rectal Q4HRS ROBERT Paula M.D.        prochlorperazine (Compazine) injection 5-10 mg  5-10 mg Intravenous Q4HRS ROBERT Paula M.D.        guaiFENesin dextromethorphan (Robitussin DM) 100-10  MG/5ML syrup 10 mL  10 mL Oral Q6HRS PRN Magen Paula M.D.   10 mL at 05/15/25 0412    cefTRIAXone (Rocephin) syringe 2,000 mg  2,000 mg Intravenous Q24HRS Magen Paula M.D.   2,000 mg at 05/16/25 1647   [2]   Allergies  Allergen Reactions    Nkda [No Known Drug Allergy]

## 2025-05-18 LAB
ALBUMIN SERPL BCP-MCNC: 3.8 G/DL (ref 3.2–4.9)
ALBUMIN/GLOB SERPL: 0.9 G/DL
ALP SERPL-CCNC: 92 U/L (ref 30–99)
ALT SERPL-CCNC: 61 U/L (ref 2–50)
ANION GAP SERPL CALC-SCNC: 9 MMOL/L (ref 7–16)
AST SERPL-CCNC: 32 U/L (ref 12–45)
BASOPHILS # BLD AUTO: 0.1 % (ref 0–1.8)
BASOPHILS # BLD: 0.02 K/UL (ref 0–0.12)
BILIRUB SERPL-MCNC: 0.4 MG/DL (ref 0.1–1.5)
BUN SERPL-MCNC: 19 MG/DL (ref 8–22)
CALCIUM ALBUM COR SERPL-MCNC: 9.5 MG/DL (ref 8.5–10.5)
CALCIUM SERPL-MCNC: 9.3 MG/DL (ref 8.5–10.5)
CHLORIDE SERPL-SCNC: 98 MMOL/L (ref 96–112)
CO2 SERPL-SCNC: 30 MMOL/L (ref 20–33)
CREAT SERPL-MCNC: 0.86 MG/DL (ref 0.5–1.4)
EOSINOPHIL # BLD AUTO: 0.02 K/UL (ref 0–0.51)
EOSINOPHIL NFR BLD: 0.1 % (ref 0–6.9)
ERYTHROCYTE [DISTWIDTH] IN BLOOD BY AUTOMATED COUNT: 45.8 FL (ref 35.9–50)
GFR SERPLBLD CREATININE-BSD FMLA CKD-EPI: 113 ML/MIN/1.73 M 2
GLOBULIN SER CALC-MCNC: 4.3 G/DL (ref 1.9–3.5)
GLUCOSE SERPL-MCNC: 91 MG/DL (ref 65–99)
HCT VFR BLD AUTO: 45.5 % (ref 42–52)
HGB BLD-MCNC: 14.5 G/DL (ref 14–18)
IMM GRANULOCYTES # BLD AUTO: 0.08 K/UL (ref 0–0.11)
IMM GRANULOCYTES NFR BLD AUTO: 0.6 % (ref 0–0.9)
LYMPHOCYTES # BLD AUTO: 1.5 K/UL (ref 1–4.8)
LYMPHOCYTES NFR BLD: 11.2 % (ref 22–41)
MCH RBC QN AUTO: 28.5 PG (ref 27–33)
MCHC RBC AUTO-ENTMCNC: 31.9 G/DL (ref 32.3–36.5)
MCV RBC AUTO: 89.4 FL (ref 81.4–97.8)
MONOCYTES # BLD AUTO: 0.34 K/UL (ref 0–0.85)
MONOCYTES NFR BLD AUTO: 2.5 % (ref 0–13.4)
NEUTROPHILS # BLD AUTO: 11.42 K/UL (ref 1.82–7.42)
NEUTROPHILS NFR BLD: 85.5 % (ref 44–72)
NRBC # BLD AUTO: 0 K/UL
NRBC BLD-RTO: 0 /100 WBC (ref 0–0.2)
PLATELET # BLD AUTO: 329 K/UL (ref 164–446)
PMV BLD AUTO: 9.3 FL (ref 9–12.9)
POTASSIUM SERPL-SCNC: 4 MMOL/L (ref 3.6–5.5)
PROT SERPL-MCNC: 8.1 G/DL (ref 6–8.2)
RBC # BLD AUTO: 5.09 M/UL (ref 4.7–6.1)
SODIUM SERPL-SCNC: 137 MMOL/L (ref 135–145)
WBC # BLD AUTO: 13.4 K/UL (ref 4.8–10.8)

## 2025-05-18 PROCEDURE — 80053 COMPREHEN METABOLIC PANEL: CPT

## 2025-05-18 PROCEDURE — 36415 COLL VENOUS BLD VENIPUNCTURE: CPT

## 2025-05-18 PROCEDURE — 97602 WOUND(S) CARE NON-SELECTIVE: CPT

## 2025-05-18 PROCEDURE — 700111 HCHG RX REV CODE 636 W/ 250 OVERRIDE (IP): Performed by: STUDENT IN AN ORGANIZED HEALTH CARE EDUCATION/TRAINING PROGRAM

## 2025-05-18 PROCEDURE — 700111 HCHG RX REV CODE 636 W/ 250 OVERRIDE (IP): Mod: JZ | Performed by: INTERNAL MEDICINE

## 2025-05-18 PROCEDURE — 700102 HCHG RX REV CODE 250 W/ 637 OVERRIDE(OP): Performed by: INTERNAL MEDICINE

## 2025-05-18 PROCEDURE — 770006 HCHG ROOM/CARE - MED/SURG/GYN SEMI*

## 2025-05-18 PROCEDURE — 99233 SBSQ HOSP IP/OBS HIGH 50: CPT | Performed by: STUDENT IN AN ORGANIZED HEALTH CARE EDUCATION/TRAINING PROGRAM

## 2025-05-18 PROCEDURE — A9270 NON-COVERED ITEM OR SERVICE: HCPCS | Performed by: INTERNAL MEDICINE

## 2025-05-18 PROCEDURE — 85025 COMPLETE CBC W/AUTO DIFF WBC: CPT

## 2025-05-18 RX ADMIN — ENOXAPARIN SODIUM 40 MG: 100 INJECTION SUBCUTANEOUS at 16:16

## 2025-05-18 RX ADMIN — PREDNISONE 40 MG: 20 TABLET ORAL at 05:15

## 2025-05-18 RX ADMIN — SENNOSIDES AND DOCUSATE SODIUM 2 TABLET: 50; 8.6 TABLET ORAL at 16:15

## 2025-05-18 ASSESSMENT — ENCOUNTER SYMPTOMS
CHILLS: 0
NAUSEA: 0
SHORTNESS OF BREATH: 1
VOMITING: 0
COUGH: 0
PND: 0
MYALGIAS: 0
DOUBLE VISION: 0
HEARTBURN: 0
BRUISES/BLEEDS EASILY: 0
BACK PAIN: 0
DIZZINESS: 0
FEVER: 0
DEPRESSION: 0
HEMOPTYSIS: 0
BLURRED VISION: 0
HEADACHES: 0
WHEEZING: 0
CLAUDICATION: 0
PALPITATIONS: 0

## 2025-05-18 ASSESSMENT — PAIN DESCRIPTION - PAIN TYPE: TYPE: ACUTE PAIN

## 2025-05-18 NOTE — WOUND TEAM
[facility]  Inpatient Services  Wound and Skin Care Brief Evaluation    Admission Date: 5/13/2025     Last order of IP CONSULT TO WOUND CARE was found on 5/16/2025 from Hospital Encounter on 5/13/2025     HPI, PMH, SH: Reviewed    Chief Complaint   Patient presents with    Shortness of Breath     For 2 weeks.         Diagnosis: Respiratory failure (HCC) [J96.90]    Unit where seen by Wound Team: S624/02     Wound consult placed regarding sacrum. Chart and images reviewed. This clinician in to assess patient. Patient pleasant and agreeable.  Hyperpigmented indentation present, likely a combination of moisture and skin tone. Linear striations of blanchable erythema to both sides of this area. Please note that clinical media appears darker than in real life.    No pressure injuries or advanced wound care needs identified. Wound consult completed. Wound team signing off, re-consult if patient has further advanced wound care needs.         PREVENTATIVE INTERVENTIONS:    Q shift Ashok - performed per nursing policy  Q shift pressure point assessments - performed per nursing policy    Surface/Positioning  Standard/trauma mattress - Currently in Place

## 2025-05-18 NOTE — FACE TO FACE
"Face to Face Note  -  Durable Medical Equipment    Umer Cage M.D. - NPI: [id number]  I certify that this patient is under my care and that they had a durable medical equipment(DME)face to face encounter by myself that meets the physician DME face-to-face encounter requirements with this patient on:    Date of encounter:   Patient:                    MRN:                       YOB: 2024  Cornelio Deluna  [id number]  1985     The encounter with the patient was in whole, or in part, for the following medical condition, which is the primary reason for durable medical equipment:  Other - Hypoxia    I certify that, based on my findings, the following durable medical equipment is medically necessary:    Oxygen   HOME O2 Saturation Measurements:(Values must be present for Home Oxygen orders)  Room air sat at rest: 88  Room air sat with amb: 85  With liters of O2: 2, O2 sat at rest with O2: 90  With Liters of O2: 2.5, O2 sat with amb with O2 : 90  Is the patient mobile?: Yes  If patient feels more short of breath, they can go up to 6 liters per minute and contact healthcare provider.    Supporting Symptoms: The patient requires supplemental oxygen, as the following interventions have been tried with limited or no improvement: \"Bronchodilators and/or steroid inhalers, \"Oral and/or IV steroids, \"Ambulation with oximetry, and \"Incentive spirometry.    My Clinical findings support the need for the above equipment due to:  Hypoxia  "

## 2025-05-18 NOTE — PROGRESS NOTES
Hospital Medicine Daily Progress Note    Date of Service  5/18/2025    Chief Complaint  Cornelio Deluna is a 40 y.o. male admitted 5/13/2025 with shortness of breath    Hospital Course  Cornelio Deluna is a 40 y.o. male with history of bladder disorder, amphetamine abuse, prior history of smoking, who presented 5/13/2025 with complaints of nonproductive cough, shortness of breath for 2 weeks.  He reported subjective chills and fever in the last 2 days.  Denies lower extremity edema or chest pain.  He was seen at urgent care on 5/6 complaining of cough, sore throat, chest pain due to cough, prescribed Medrol pack.  He was then seen at ER on 5/11, COVID-19 and influenza negative, chest x-ray showed no infiltrates.  Was thought that he has viral condition.  Presented tachycardic, tachypneic, blood pressure 203/116, requires 6 L nasal cannula at this time.  CBC showed leukocytosis 15.2.  Pro BNP and troponin are not elevated.  CTA of the chest: No evidence of PE.  Hazy groundglass opacities in bilateral lung bases.  COVID-19 influenza RSV PCR negative.  Treatment included Solu-Medrol.  ERP patient had expiratory wheezes.  On my exam there are no wheezes, but bibasilar Rales    Interval Problem Update  5/14: Patient is new to me today, patient is sitting at the edge of the bed, family is at bedside, he is alert oriented follows commands, trace lower extremity edema, chest x-ray showed cephalization, I reviewed echocardiogram that showed normal EF, CTA chest was negative for PE but showed probably bilateral infiltrates, continue IV antibiotics, continue IV Lasix, discussed with bedside nurse discussed with charge nurse pharmacist.  5/15 patient is resting in bed, he is sitting at the edge of the bed, family is at bedside, he is alert oriented follows commands he is able to speak in full sentences, no fever no chills, discussed regarding results available, discussed with bedside nurse  charge nurse  pharmacist, continue incentive spirometry continue weaning off oxygen    5/16  No acute events overnight.  Patient still feels short of breath but improved from prior.  Reports his breathing gets worse overnight or when he lies flat.  Still needs up to 6 L oxygen with activity.  White count improving.  Other labs within normal limits.  Continue ceftriaxone, Lasix.  Add steroids today to monitor response.    5/17  No acute events overnight.  Breathing better but still on 3.5 L oxygen.  Evaluated by pulmonology yesterday and recommends outpatient sleep study and +/- right heart cath.  Plan to continue steroid and diuresis today.  Home O2 eval in AM followed by possible discharge.    5/18  Reports feeling well overall.  Still remains on 3 L oxygen.  Denies any worsening shortness of breath, chest pain, cough, fevers.  Home oxygen eval was done however due to his insurance coverage, unable to find DME coverage.  Case management to work on arranging home oxygen prior to discharge.  Possibly tomorrow.    I have discussed this patient's plan of care and discharge plan at IDT rounds today with Case Management, Nursing, Nursing leadership, and other members of the IDT team.    Consultants/Specialty      Code Status  Full Code    Disposition  The patient is not medically cleared for discharge to home or a post-acute facility.  Anticipate discharge to: home with close outpatient follow-up    I have placed the appropriate orders for post-discharge needs.    Review of Systems  Review of Systems   Constitutional:  Negative for chills and fever.   Eyes:  Negative for blurred vision and double vision.   Respiratory:  Positive for shortness of breath. Negative for cough, hemoptysis and wheezing.    Cardiovascular:  Negative for chest pain, palpitations, claudication, leg swelling and PND.   Gastrointestinal:  Negative for heartburn, nausea and vomiting.   Genitourinary:  Negative for hematuria and urgency.    Musculoskeletal:  Negative for back pain and myalgias.   Skin:  Negative for rash.   Neurological:  Negative for dizziness and headaches.   Endo/Heme/Allergies:  Does not bruise/bleed easily.   Psychiatric/Behavioral:  Negative for depression.         Physical Exam  Temp:  [36.6 °C (97.8 °F)-37.1 °C (98.7 °F)] 36.7 °C (98 °F)  Pulse:  [60-79] 64  Resp:  [16-18] 16  BP: (111-142)/(71-88) 142/88  SpO2:  [91 %-98 %] 92 %    Physical Exam  Vitals and nursing note reviewed.   Constitutional:       General: He is not in acute distress.     Appearance: Normal appearance.   Eyes:      General:         Right eye: No discharge.         Left eye: No discharge.   Cardiovascular:      Rate and Rhythm: Normal rate and regular rhythm.      Pulses: Normal pulses.      Heart sounds: Normal heart sounds.   Pulmonary:      Effort: Pulmonary effort is normal. No respiratory distress.      Breath sounds: Rales present.   Abdominal:      General: Bowel sounds are normal. There is no distension.      Tenderness: There is no abdominal tenderness.   Musculoskeletal:         General: Normal range of motion.      Cervical back: Normal range of motion and neck supple.      Right lower leg: Edema present.      Left lower leg: Edema present.   Skin:     General: Skin is warm and dry.      Capillary Refill: Capillary refill takes less than 2 seconds.      Coloration: Skin is not jaundiced.   Neurological:      General: No focal deficit present.      Mental Status: He is alert and oriented to person, place, and time.      Cranial Nerves: No cranial nerve deficit.   Psychiatric:         Mood and Affect: Mood normal.         Behavior: Behavior normal.         Fluids    Intake/Output Summary (Last 24 hours) at 5/18/2025 1301  Last data filed at 5/18/2025 0517  Gross per 24 hour   Intake 4090 ml   Output --   Net 4090 ml        Laboratory  Recent Labs     05/16/25  0107 05/17/25  0014 05/18/25  0808   WBC 10.3 8.3 13.4*   RBC 4.73 5.05 5.09    HEMOGLOBIN 14.0 14.7 14.5   HEMATOCRIT 42.1 44.5 45.5   MCV 89.0 88.1 89.4   MCH 29.6 29.1 28.5   MCHC 33.3 33.0 31.9*   RDW 46.3 45.3 45.8   PLATELETCT 292 303 329   MPV 9.3 9.2 9.3     Recent Labs     05/16/25  0107 05/17/25  0014 05/18/25  0808   SODIUM 138 135 137   POTASSIUM 4.1 4.4 4.0   CHLORIDE 99 98 98   CO2 28 26 30   GLUCOSE 94 170* 91   BUN 17 14 19   CREATININE 0.90 0.82 0.86   CALCIUM 8.7 9.2 9.3                   Imaging  EC-ECHOCARDIOGRAM COMPLETE W/O CONT   Final Result      CT-CTA CHEST PULMONARY ARTERY W/ RECONS   Final Result      1.  No CT evidence of pulmonary embolism but evaluation is limited due to motion.   2.  Hazy groundglass opacity in the bilateral lung bases, likely atelectasis.         DX-CHEST-PORTABLE (1 VIEW)   Final Result         1. No acute cardiopulmonary abnormalities are identified.           Assessment/Plan  * Acute respiratory failure with hypoxia- (present on admission)  Assessment & Plan  -Presented with cough and shortness of breath for 2 weeks, hypoxia on 6 L nasal cannula, tachypnea, tachycardia, leukocytosis WBC 15.2  -CTA of the chest: Negative for PE; bibasilar groundglass opacities that could be atypical infection or edema  -COVID-19/influenza/RSV screen negative. Respiratory PCR panel negative.  -ECHO with normal EF and no wall motion abnormalities.  -Continue ceftriaxone, completed azithromycin for possible community-acquired pneumonia  -DuoNeb every 4 hours  -Given a dose of IV Solu-Medrol 125 mg once.  Currently on prednisone 40 mg daily.  -Pulmonology consulted.  Recommends outpatient sleep study and +/- right heart cath for pulmonary hypertension eval.  -Continuous pulse oximetry.  Maintain O2 saturations > 92%.  -Pending home oxygen arrangement.  Case management working on it.      Leukocytosis- (present on admission)  Assessment & Plan  -Leukocytosis trending down  -Procalcitonin is negative    Hyperglycemia- (present on admission)  Assessment &  Plan  -A1c 6    Hypertensive urgency- (present on admission)  Assessment & Plan  -Presented with blood pressure 203/119, spontaneously improved  -IV Lasix 40 mg once for possible mild fluid overload  -IV hydralazine as needed             VTE prophylaxis: Lovenox    I have performed a physical exam and reviewed and updated ROS and Plan today (5/18/2025). In review of yesterday's note (5/17/2025), there are no changes except as documented above.      Total time of 51 minutes spent prepping to see patient (e.g. reviewing  tests/imaging results, notes from consultants, bedside nurse, night shift ) obtaining and/or reviewing separately obtained history. Performing a medically appropriate examination and evaluation.  Counseling and educating the patient.  Ordering medications, tests, or procedures.  Referring and communicating with other health care professionals.  Documenting clinical information in EPIC.  Independently interpreting results and communicating results to patient.  Care coordination.

## 2025-05-18 NOTE — CARE PLAN
The patient is Watcher - Medium risk of patient condition declining or worsening    Shift Goals  Clinical Goals: O2 sat. 90%>, home O2 eval.  Patient Goals: rest and sleep  Family Goals: updates    Progress made toward(s) clinical / shift goals: Patient is on 2lpm nc at 90%-91% O2 sat. Instructed to use I/S qhourly when awake. No sx/sy of respiratory distress noted. Home O2 eval. completed.     Patient is not progressing towards the following goals: n/a

## 2025-05-18 NOTE — CARE PLAN
The patient is Watcher - Medium risk of patient condition declining or worsening    Shift Goals  Clinical Goals: Establish home o2 supplies  Patient Goals: rest and sleep  Family Goals: updates    Progress made toward(s) clinical / shift goals:    Problem: Infection - Standard  Goal: Patient will remain free from infection  Outcome: Progressing  Flowsheets (Taken 5/18/2025 1300)  Standard Infection Interventions:   Assessed for signs and symptoms of infection   Instructed patient/family on signs and symptoms of infection   Provided education on proper hand hygiene and infection prevention measures   Assessed for removal IV, central lines, intra-arterial or urinary catheters  Note: Vitals within normal limits, denies fever/chills,      Problem: Respiratory  Goal: Patient will achieve/maintain optimum respiratory ventilation and gas exchange  Outcome: Progressing  Flowsheets (Taken 5/18/2025 1303)  O2 Delivery Device: Nasal Cannula  Incentive Spirometer: Weak Effort  Incentive Spirometer Volume: 1000 mL  Note: Pt still requiring 2-3L of o2 via nasal cannula, pending home oxygen delivery,        Patient is not progressing towards the following goals:

## 2025-05-18 NOTE — DISCHARGE PLANNING
Received Choice Form at: 0923  Agency/Facility Name: [facility]  Sent Referral per Choice Form at: 0923    Agency/Facility Name: [facility]  Spoke To: Rosaura   Outcome: Will call back regarding whether they can accept patients insurance

## 2025-05-18 NOTE — DISCHARGE PLANNING
Case Management Discharge Planning    Admission Date: 5/13/2025  GMLOS: 3.5  ALOS: 5    6-Clicks ADL Score: 24  6-Clicks Mobility Score: 23      Anticipated Discharge Dispo: Discharge Disposition: Discharged to home/self care (01)    DME Needed: Yes    DME Ordered: Yes    Action(s) Taken: RN CM met with pt who signed choice for oxygen for [facility], [facility], faxed to [facility], Referral sent to [facility] per [facility]. Pt cleared for dc once O2 delivered.    Escalations Completed: None    Medically Clear: Yes    Next Steps: F/U with O2 referral    Barriers to Discharge: DME    Is the patient up for discharge tomorrow: No        1300-Notified by [facility] that pt's insurance does not have coverage for DME. RN CM updated pt that he would have to do self pay for oxygen but may not be able to do it on the weekends. MARTELL Singh called [facility] and [facility] and they do not do self pay on the weekends. VM left for [employer].

## 2025-05-19 ENCOUNTER — PHARMACY VISIT (OUTPATIENT)
Dept: PHARMACY | Facility: MEDICAL CENTER | Age: 40
End: 2025-05-19
Payer: COMMERCIAL

## 2025-05-19 VITALS
DIASTOLIC BLOOD PRESSURE: 88 MMHG | BODY MASS INDEX: 34.78 KG/M2 | OXYGEN SATURATION: 92 % | RESPIRATION RATE: 18 BRPM | SYSTOLIC BLOOD PRESSURE: 139 MMHG | TEMPERATURE: 97.7 F | WEIGHT: 208.78 LBS | HEART RATE: 66 BPM | HEIGHT: 65 IN

## 2025-05-19 LAB
ALBUMIN SERPL BCP-MCNC: 3.4 G/DL (ref 3.2–4.9)
ALBUMIN/GLOB SERPL: 0.9 G/DL
ALP SERPL-CCNC: 83 U/L (ref 30–99)
ALT SERPL-CCNC: 57 U/L (ref 2–50)
ANION GAP SERPL CALC-SCNC: 11 MMOL/L (ref 7–16)
AST SERPL-CCNC: 30 U/L (ref 12–45)
BASOPHILS # BLD AUTO: 0.2 % (ref 0–1.8)
BASOPHILS # BLD: 0.02 K/UL (ref 0–0.12)
BILIRUB SERPL-MCNC: 0.3 MG/DL (ref 0.1–1.5)
BUN SERPL-MCNC: 22 MG/DL (ref 8–22)
CALCIUM ALBUM COR SERPL-MCNC: 9.2 MG/DL (ref 8.5–10.5)
CALCIUM SERPL-MCNC: 8.7 MG/DL (ref 8.5–10.5)
CHLORIDE SERPL-SCNC: 102 MMOL/L (ref 96–112)
CO2 SERPL-SCNC: 25 MMOL/L (ref 20–33)
CREAT SERPL-MCNC: 0.89 MG/DL (ref 0.5–1.4)
EOSINOPHIL # BLD AUTO: 0.06 K/UL (ref 0–0.51)
EOSINOPHIL NFR BLD: 0.5 % (ref 0–6.9)
ERYTHROCYTE [DISTWIDTH] IN BLOOD BY AUTOMATED COUNT: 46 FL (ref 35.9–50)
GFR SERPLBLD CREATININE-BSD FMLA CKD-EPI: 112 ML/MIN/1.73 M 2
GLOBULIN SER CALC-MCNC: 3.6 G/DL (ref 1.9–3.5)
GLUCOSE SERPL-MCNC: 104 MG/DL (ref 65–99)
HCT VFR BLD AUTO: 41.2 % (ref 42–52)
HGB BLD-MCNC: 13.1 G/DL (ref 14–18)
IMM GRANULOCYTES # BLD AUTO: 0.05 K/UL (ref 0–0.11)
IMM GRANULOCYTES NFR BLD AUTO: 0.4 % (ref 0–0.9)
LYMPHOCYTES # BLD AUTO: 2.82 K/UL (ref 1–4.8)
LYMPHOCYTES NFR BLD: 24.1 % (ref 22–41)
MCH RBC QN AUTO: 28.6 PG (ref 27–33)
MCHC RBC AUTO-ENTMCNC: 31.8 G/DL (ref 32.3–36.5)
MCV RBC AUTO: 90 FL (ref 81.4–97.8)
MONOCYTES # BLD AUTO: 0.67 K/UL (ref 0–0.85)
MONOCYTES NFR BLD AUTO: 5.7 % (ref 0–13.4)
NEUTROPHILS # BLD AUTO: 8.09 K/UL (ref 1.82–7.42)
NEUTROPHILS NFR BLD: 69.1 % (ref 44–72)
NRBC # BLD AUTO: 0 K/UL
NRBC BLD-RTO: 0 /100 WBC (ref 0–0.2)
PLATELET # BLD AUTO: 302 K/UL (ref 164–446)
PMV BLD AUTO: 9.3 FL (ref 9–12.9)
POTASSIUM SERPL-SCNC: 4.1 MMOL/L (ref 3.6–5.5)
PROT SERPL-MCNC: 7 G/DL (ref 6–8.2)
RBC # BLD AUTO: 4.58 M/UL (ref 4.7–6.1)
SODIUM SERPL-SCNC: 138 MMOL/L (ref 135–145)
WBC # BLD AUTO: 11.7 K/UL (ref 4.8–10.8)

## 2025-05-19 PROCEDURE — 80053 COMPREHEN METABOLIC PANEL: CPT

## 2025-05-19 PROCEDURE — RXMED WILLOW AMBULATORY MEDICATION CHARGE: Performed by: STUDENT IN AN ORGANIZED HEALTH CARE EDUCATION/TRAINING PROGRAM

## 2025-05-19 PROCEDURE — 99238 HOSP IP/OBS DSCHRG MGMT 30/<: CPT | Performed by: STUDENT IN AN ORGANIZED HEALTH CARE EDUCATION/TRAINING PROGRAM

## 2025-05-19 PROCEDURE — 85025 COMPLETE CBC W/AUTO DIFF WBC: CPT

## 2025-05-19 PROCEDURE — 700111 HCHG RX REV CODE 636 W/ 250 OVERRIDE (IP): Performed by: STUDENT IN AN ORGANIZED HEALTH CARE EDUCATION/TRAINING PROGRAM

## 2025-05-19 RX ORDER — PREDNISONE 20 MG/1
20 TABLET ORAL DAILY
Qty: 5 TABLET | Refills: 0 | Status: SHIPPED | OUTPATIENT
Start: 2025-05-19 | End: 2025-05-24

## 2025-05-19 RX ADMIN — PREDNISONE 40 MG: 20 TABLET ORAL at 06:32

## 2025-05-19 ASSESSMENT — ENCOUNTER SYMPTOMS
DIZZINESS: 0
SHORTNESS OF BREATH: 1
HEARTBURN: 0
PALPITATIONS: 0
NAUSEA: 0
FEVER: 0
DOUBLE VISION: 0
HEADACHES: 0
BLURRED VISION: 0
PND: 0
CHILLS: 0
BACK PAIN: 0
BRUISES/BLEEDS EASILY: 0
MYALGIAS: 0
WHEEZING: 0
HEMOPTYSIS: 0
VOMITING: 0
COUGH: 0
CLAUDICATION: 0
DEPRESSION: 0

## 2025-05-19 NOTE — DISCHARGE PLANNING
Case Management Discharge Planning    Admission Date: 5/13/2025  GMLOS: 3.5  ALOS: 6    6-Clicks ADL Score: 24  6-Clicks Mobility Score: 23      Anticipated Discharge Dispo: Discharge Disposition: Discharged to home/self care (01)    DME Needed: Yes    DME Ordered: Yes    Action(s) Taken: Patient is medically cleared to discharge today once oxygen is delivered. Patient's insurance does not cover DME and patient will be self pay. RNCM requested DPA send referral to [facility].   1047: [facility] do not accept self pay. [facility] is willing to do an approved service for the first 30 days while patient is pending medicaid. [facility] will only do an approved service to buy a POC. RNCM requested DPA have [facility] run patient's insurance to double check if DME will be covered.   1200: Per DPA, [facility] is processing the oxygen referral.   1351: [facility] will be delivering patient's oxygen this afternoon.     Escalations Completed: None    Medically Clear: Yes    Next Steps: pending [facility] acceptance and delivery    Barriers to Discharge: DME    Is the patient up for discharge tomorrow: No - today

## 2025-05-19 NOTE — CARE PLAN
The patient is Stable - Low risk of patient condition declining or worsening    Shift Goals  Clinical Goals: Pt will be free from injury this shift  Patient Goals: rest  Family Goals: festus    Progress made toward(s) clinical / shift goals:  Aox4. Pt received ambulating in hallway with oxygen. No c/o pain noted. Rested well through the night. Due meds tolerated well.    Patient is not progressing towards the following goals:

## 2025-05-19 NOTE — PROGRESS NOTES
Hospital Medicine Daily Progress Note    Date of Service  5/19/2025    Chief Complaint  Cornelio Deluna is a 40 y.o. male admitted 5/13/2025 with shortness of breath    Hospital Course  Cornelio Deluna is a 40 y.o. male with history of bladder disorder, amphetamine abuse, prior history of smoking, who presented 5/13/2025 with complaints of nonproductive cough, shortness of breath for 2 weeks.  He reported subjective chills and fever in the last 2 days.  Denies lower extremity edema or chest pain.  He was seen at urgent care on 5/6 complaining of cough, sore throat, chest pain due to cough, prescribed Medrol pack.  He was then seen at ER on 5/11, COVID-19 and influenza negative, chest x-ray showed no infiltrates.  Was thought that he has viral condition.  Presented tachycardic, tachypneic, blood pressure 203/116, requires 6 L nasal cannula at this time.  CBC showed leukocytosis 15.2.  Pro BNP and troponin are not elevated.  CTA of the chest: No evidence of PE.  Hazy groundglass opacities in bilateral lung bases.  COVID-19 influenza RSV PCR negative.  Treatment included Solu-Medrol.  ERP patient had expiratory wheezes.  On my exam there are no wheezes, but bibasilar Rales    Interval Problem Update  5/14: Patient is new to me today, patient is sitting at the edge of the bed, family is at bedside, he is alert oriented follows commands, trace lower extremity edema, chest x-ray showed cephalization, I reviewed echocardiogram that showed normal EF, CTA chest was negative for PE but showed probably bilateral infiltrates, continue IV antibiotics, continue IV Lasix, discussed with bedside nurse discussed with charge nurse pharmacist.  5/15 patient is resting in bed, he is sitting at the edge of the bed, family is at bedside, he is alert oriented follows commands he is able to speak in full sentences, no fever no chills, discussed regarding results available, discussed with bedside nurse  charge nurse  pharmacist, continue incentive spirometry continue weaning off oxygen    5/16  No acute events overnight.  Patient still feels short of breath but improved from prior.  Reports his breathing gets worse overnight or when he lies flat.  Still needs up to 6 L oxygen with activity.  White count improving.  Other labs within normal limits.  Continue ceftriaxone, Lasix.  Add steroids today to monitor response.    5/17  No acute events overnight.  Breathing better but still on 3.5 L oxygen.  Evaluated by pulmonology yesterday and recommends outpatient sleep study and +/- right heart cath.  Plan to continue steroid and diuresis today.  Home O2 eval in AM followed by possible discharge.    5/18  Reports feeling well overall.  Still remains on 3 L oxygen.  Denies any worsening shortness of breath, chest pain, cough, fevers.  Home oxygen eval was done however due to his insurance coverage, unable to find DME coverage.  Case management to work on arranging home oxygen prior to discharge.  Possibly tomorrow.    5/19  No acute events overnight.  Feels well overall.  Has been ambulating a lot.  However continues to need 3 to 3.5 L oxygen.  Discharge pending arrangement of home oxygen due to insurance coverage issues.    I have discussed this patient's plan of care and discharge plan at IDT rounds today with Case Management, Nursing, Nursing leadership, and other members of the IDT team.    Consultants/Specialty      Code Status  Full Code    Disposition  The patient is medically cleared for discharge to home or a post-acute facility.  Anticipate discharge to: home with close outpatient follow-up    I have placed the appropriate orders for post-discharge needs.    Review of Systems  Review of Systems   Constitutional:  Negative for chills and fever.   Eyes:  Negative for blurred vision and double vision.   Respiratory:  Positive for shortness of breath. Negative for cough, hemoptysis and wheezing.     Cardiovascular:  Negative for chest pain, palpitations, claudication, leg swelling and PND.   Gastrointestinal:  Negative for heartburn, nausea and vomiting.   Genitourinary:  Negative for hematuria and urgency.   Musculoskeletal:  Negative for back pain and myalgias.   Skin:  Negative for rash.   Neurological:  Negative for dizziness and headaches.   Endo/Heme/Allergies:  Does not bruise/bleed easily.   Psychiatric/Behavioral:  Negative for depression.         Physical Exam  Temp:  [36.4 °C (97.5 °F)-37.2 °C (99 °F)] 36.5 °C (97.7 °F)  Pulse:  [66-85] 66  Resp:  [18-19] 18  BP: ()/() 139/88  SpO2:  [92 %-95 %] 92 %    Physical Exam  Vitals and nursing note reviewed.   Constitutional:       General: He is not in acute distress.     Appearance: Normal appearance.   Eyes:      General:         Right eye: No discharge.         Left eye: No discharge.   Cardiovascular:      Rate and Rhythm: Normal rate and regular rhythm.      Pulses: Normal pulses.      Heart sounds: Normal heart sounds.   Pulmonary:      Effort: Pulmonary effort is normal. No respiratory distress.      Breath sounds: Rales present.   Abdominal:      General: Bowel sounds are normal. There is no distension.      Tenderness: There is no abdominal tenderness.   Musculoskeletal:         General: Normal range of motion.      Cervical back: Normal range of motion and neck supple.      Right lower leg: Edema present.      Left lower leg: Edema present.   Skin:     General: Skin is warm and dry.      Capillary Refill: Capillary refill takes less than 2 seconds.      Coloration: Skin is not jaundiced.   Neurological:      General: No focal deficit present.      Mental Status: He is alert and oriented to person, place, and time.      Cranial Nerves: No cranial nerve deficit.   Psychiatric:         Mood and Affect: Mood normal.         Behavior: Behavior normal.         Fluids    Intake/Output Summary (Last 24 hours) at 5/19/2025 1319  Last data  filed at 5/18/2025 1857  Gross per 24 hour   Intake 200 ml   Output --   Net 200 ml        Laboratory  Recent Labs     05/17/25  0014 05/18/25  0808 05/19/25  0123   WBC 8.3 13.4* 11.7*   RBC 5.05 5.09 4.58*   HEMOGLOBIN 14.7 14.5 13.1*   HEMATOCRIT 44.5 45.5 41.2*   MCV 88.1 89.4 90.0   MCH 29.1 28.5 28.6   MCHC 33.0 31.9* 31.8*   RDW 45.3 45.8 46.0   PLATELETCT 303 329 302   MPV 9.2 9.3 9.3     Recent Labs     05/17/25  0014 05/18/25  0808 05/19/25  0123   SODIUM 135 137 138   POTASSIUM 4.4 4.0 4.1   CHLORIDE 98 98 102   CO2 26 30 25   GLUCOSE 170* 91 104*   BUN 14 19 22   CREATININE 0.82 0.86 0.89   CALCIUM 9.2 9.3 8.7                   Imaging  EC-ECHOCARDIOGRAM COMPLETE W/O CONT   Final Result      CT-CTA CHEST PULMONARY ARTERY W/ RECONS   Final Result      1.  No CT evidence of pulmonary embolism but evaluation is limited due to motion.   2.  Hazy groundglass opacity in the bilateral lung bases, likely atelectasis.         DX-CHEST-PORTABLE (1 VIEW)   Final Result         1. No acute cardiopulmonary abnormalities are identified.           Assessment/Plan  * Acute respiratory failure with hypoxia- (present on admission)  Assessment & Plan  -Presented with cough and shortness of breath for 2 weeks, hypoxia on 6 L nasal cannula, tachypnea, tachycardia, leukocytosis WBC 15.2  -CTA of the chest: Negative for PE; bibasilar groundglass opacities that could be atypical infection or edema  -COVID-19/influenza/RSV screen negative. Respiratory PCR panel negative.  -ECHO with normal EF and no wall motion abnormalities.  -Continue ceftriaxone, completed azithromycin for possible community-acquired pneumonia  -DuoNeb every 4 hours  -Given a dose of IV Solu-Medrol 125 mg once.  Currently on prednisone 40 mg daily.  -Pulmonology consulted.  Recommends outpatient sleep study and +/- right heart cath for pulmonary hypertension eval.  -Continuous pulse oximetry.  Maintain O2 saturations > 92%.  -Pending home oxygen arrangement.   Case management working on it.      Leukocytosis- (present on admission)  Assessment & Plan  -Leukocytosis trending down  -Procalcitonin is negative    Hyperglycemia- (present on admission)  Assessment & Plan  -A1c 6    Hypertensive urgency- (present on admission)  Assessment & Plan  -Presented with blood pressure 203/119, spontaneously improved  -IV Lasix 40 mg once for possible mild fluid overload  -IV hydralazine as needed             VTE prophylaxis: Lovenox    I have performed a physical exam and reviewed and updated ROS and Plan today (5/19/2025). In review of yesterday's note (5/18/2025), there are no changes except as documented above.      Total time of 51 minutes spent prepping to see patient (e.g. reviewing  tests/imaging results, notes from consultants, bedside nurse, night shift ) obtaining and/or reviewing separately obtained history. Performing a medically appropriate examination and evaluation.  Counseling and educating the patient.  Ordering medications, tests, or procedures.  Referring and communicating with other health care professionals.  Documenting clinical information in EPIC.  Independently interpreting results and communicating results to patient.  Care coordination.

## 2025-05-19 NOTE — CARE PLAN
The patient is Watcher - Medium risk of patient condition declining or worsening    Shift Goals  Clinical Goals: maintain o2 sats >90%  Patient Goals: rest  Family Goals: festus    Progress made toward(s) clinical / shift goals:        Problem: Infection - Standard  Goal: Patient will remain free from infection  Outcome: Progressing  Flowsheets (Taken 5/18/2025 1308)  Standard Infection Interventions:   Assessed for signs and symptoms of infection   Instructed patient/family on signs and symptoms of infection   Provided education on proper hand hygiene and infection prevention measures   Assessed for removal IV, central lines, intra-arterial or urinary catheters  Note: Vitals within normal limits, denies fever/chills,      Problem: Respiratory  Goal: Patient will achieve/maintain optimum respiratory ventilation and gas exchange  Outcome: Progressing  Flowsheets  Taken 5/19/2025 1319 by Kris Geronimo R.N.  Incentive Spirometer: Weak Effort  Incentive Spirometer Volume: 1000 mL  Taken 5/19/2025 0728 by Lelo Esquivel, C.N.A.  O2 Delivery Device: Nasal Cannula  Note: Remains on 2-3L of oxygen via nasal cannula, o2 sat at 92%, incentive spirometer provided,       Pt aox4, medically cleared, pending home o2 supplies to be delivered, vitals within normal limits, no sob or difficulty breathing at this time, needs met, call light within reach        Patient is not progressing towards the following goals:

## 2025-05-19 NOTE — DISCHARGE PLANNING
Spoke To: Hemant   Agency/Facility Name: [facility]   Plan or Request: 02 approved will be delivered late afternoon

## 2025-05-19 NOTE — DISCHARGE INSTRUCTIONS
FOLLOW UP ITEMS POST DISCHARGE  Follow-up with PCP  Follow-up with pulmonology  Follow-up with sleep clinic

## 2025-05-19 NOTE — DISCHARGE SUMMARY
Discharge Summary    CHIEF COMPLAINT ON ADMISSION  Chief Complaint   Patient presents with    Shortness of Breath     For 2 weeks.           Reason for Admission  SOB     Admission Date  5/13/2025    CODE STATUS  Full Code    HPI & HOSPITAL COURSE  40-year-old male with PMH of bladder issues, amphetamine abuse, tobacco use disorder presented on 5/13/2025 with complaints of nonproductive cough, shortness of breath for 2 weeks.  He was seen in urgent care a week prior and was prescribed Medrol pack.  However his symptoms continued to get worse and he decided to come to ER.  On presentation he was found to be tachycardic, tachypneic, hypertensive, hypoxic requiring 6 L O2.  Lab work was significant for WBC 15.2, CT angio chest was negative for PE but did show hazy groundglass opacities in bilateral lung bases.  He was started on IV ceftriaxone and azithromycin empirically for pneumonia.  He was started on IV Solu-Medrol.  ECHO was done which showed normal EF.  He was initially on 6 L but was able to wean down to 3.5 L of oxygen during the hospitalization but was unable to wean down any further.  Pulmonology evaluated the patient and recommended outpatient sleep study and possible right heart cath as well to evaluate for pulmonary hypertension.  Patient is currently feeling well and denies any shortness of breath.  However he still needs 3.5 L oxygen.  Discharge was delayed due to difficulty arranging home oxygen because of insurance issues.  He was educated about return precautions to the ER and voiced understanding.  He will be discharged home on 5 days of p.o. prednisone.  He was strongly recommended to follow-up with pulmonology outpatient and sleep clinic for further evaluation of his hypoxia.  Patient is agreeable with the plan.    Therefore, he is discharged in fair and stable condition to home with close outpatient follow-up.    The patient met 2-midnight criteria for an inpatient stay at the time of  discharge.    Discharge Date  5/19/2025    FOLLOW UP ITEMS POST DISCHARGE  Follow-up with PCP  Follow-up with pulmonology  Follow-up with sleep clinic    DISCHARGE DIAGNOSES  Principal Problem:    Acute respiratory failure with hypoxia (POA: Yes)  Active Problems:    Hypertensive urgency (POA: Yes)    Hyperglycemia (POA: Yes)    Leukocytosis (POA: Yes)  Resolved Problems:    * No resolved hospital problems. *      FOLLOW UP  No future appointments.  No follow-up provider specified.    MEDICATIONS ON DISCHARGE     Medication List        START taking these medications        Instructions   predniSONE 20 MG Tabs  Commonly known as: Deltasone   Doctor's comments:    Take 1 Tablet by mouth every day for 5 days.  Dose: 20 mg              Allergies  Allergies[1]    DIET  Orders Placed This Encounter   Procedures    Diet Order Diet: Regular     Standing Status:   Standing     Number of Occurrences:   1     Diet::   Regular [1]       ACTIVITY  As tolerated.  Weight bearing as tolerated    CONSULTATIONS  Pulmonology    PROCEDURES  None    LABORATORY  Lab Results   Component Value Date    SODIUM 138 05/19/2025    POTASSIUM 4.1 05/19/2025    CHLORIDE 102 05/19/2025    CO2 25 05/19/2025    GLUCOSE 104 (H) 05/19/2025    BUN 22 05/19/2025    CREATININE 0.89 05/19/2025        Lab Results   Component Value Date    WBC 11.7 (H) 05/19/2025    HEMOGLOBIN 13.1 (L) 05/19/2025    HEMATOCRIT 41.2 (L) 05/19/2025    PLATELETCT 302 05/19/2025        Total time of the discharge process exceeds 30 minutes.       [1]   Allergies  Allergen Reactions    Nkda [No Known Drug Allergy]

## 2025-05-19 NOTE — DISCHARGE PLANNING
Referral sent to [facility] for 02     1028- Spoke To:Hemant   Agency/Facility Name: [facility]  Plan or Request: If pt is pending for [place] medicare, [facility] can do the first 30 days for 02 with an approved services.    1133- Spoke To: Hemant  Agency/Facility Name: [facility]  Plan or Request: Pt does have insurance coverage for 02. Referral currently processing.

## 2025-05-20 NOTE — PROGRESS NOTES
Cornelio Paul Galdamezilazo has been provided discharge instructions, to include follow up care, home medications, and activity/diet reviewed. Copy of discharge instructions in patient chart, signed and reviewed. Patient verbalizes the understanding of the discharge instructions. Arm band removed. Patient did not have home meds during admit. Meds to Beds verified and given to pt. Pt remains on DCL O2 concentrator while awaiting meds/ride. Family has home O2 tank and concentrator for transportation home. Questions and concerns addressed prior to leaving the discharge lounge. Transported via car, accompanied by family. Patient discharge to home.    A qualified  was used to interpret Martiniquais during this encounter.  ’s name/ID number was [id number] and mode of interpretation was iPad.

## 2025-05-22 NOTE — Clinical Note
REFERRAL APPROVAL NOTICE         Sent on May 22, 2025                   Cornelio Deluna  [address]  [place] [address]                   Dear Mr. Moise Deluna,    After a careful review of the medical information and benefit coverage, [facility] has processed your referral. See below for additional details.    If applicable, you must be actively enrolled with your insurance for coverage of the authorized service. If you have any questions regarding your coverage, please contact your insurance directly.    REFERRAL INFORMATION   Referral #:  [id number]  Referred-To Department    Referred-By Provider:  Pulmonology    Umer Cage M.D.   Pulmonary Rehab [facility]      [address]  [place] [address]  [phone] [address]  [place] [address]  [phone]    Referral Start Date:  05/18/2025  Referral End Date:   05/18/2026             SCHEDULING  If you do not already have an appointment, please call [phone] to make an appointment.     MORE INFORMATION  If you do not already have a [employer] account, sign up at: [email or web address]  You can access your medical information, make appointments, see lab results, billing information, and more.  If you have questions regarding this referral, please contact  the [facility] Referrals department at:             [phone]. Monday - Friday 8:00AM - 5:00PM.     Sincerely,    [facility]

## 2025-05-23 NOTE — Clinical Note
REFERRAL APPROVAL NOTICE         Sent on May 23, 2025                   Cornelio Deluna  [address]  [place] [address]                   Dear Mr. Moise Deluna,    After a careful review of the medical information and benefit coverage, [facility] has processed your referral. See below for additional details.    If applicable, you must be actively enrolled with your insurance for coverage of the authorized service. If you have any questions regarding your coverage, please contact your insurance directly.    REFERRAL INFORMATION   Referral #:  [id number]  Referred-To Department    Referred-By Provider:  Pulmonary and Sleep Medicine    Umer Cage M.D.   Pulmonary/sleep c      [address]  [place] [address]  [phone] [address], [address]  [place] [id number] [phone]    Referral Start Date:  05/18/2025  Referral End Date:   05/18/2026           SCHEDULING  If you do not already have an appointment, please call [phone] to make an appointment.   MORE INFORMATION  As a reminder, [facility] ownership has changed, meaning this location is now owned and operated by [facility]. As such, we want to clarify that our patients should expect to receive two separate bills for the services received at [facility] - one representing the [facility] facility fees as the owner of the establishment, and the other to represent the physician's services and subsequent fees. You can speak with your insurance carrier for a pricing estimate by calling the customer service number on the back of your card and ask about charges for a hospital outpatient visit.  If you do not already have a [employer] account, sign up at: [email or web address]  You can access your medical information, make appointments, see lab results,  billing information, and more.  If you have questions regarding this referral, please contact  the [facility] department at:             [phone]. Monday - Friday 7:30AM - 5:00PM.      Sincerely,  [facility]

## 2025-05-23 NOTE — Clinical Note
REFERRAL APPROVAL NOTICE         Sent on May 23, 2025                   Cornelio Deluna  [address]  [place] [address]                   Dear Mr. Moise Deluna,    After a careful review of the medical information and benefit coverage, [facility] has processed your referral. See below for additional details.    If applicable, you must be actively enrolled with your insurance for coverage of the authorized service. If you have any questions regarding your coverage, please contact your insurance directly.    REFERRAL INFORMATION   Referral #:  [id number]  Referred-To Department    Referred-By Provider:  Pulmonary and Sleep Medicine    Umer Cage M.D.   Pulmonary/sleep c      [address]  [place] [address]  [phone] [address], [address]  [place] [id number] [phone]    Referral Start Date:  05/18/2025  Referral End Date:   05/18/2025           SCHEDULING  If you do not already have an appointment, please call [phone] to make an appointment.   MORE INFORMATION  As a reminder, [facility] ownership has changed, meaning this location is now owned and operated by [facility]. As such, we want to clarify that our patients should expect to receive two separate bills for the services received at [facility] - one representing the [facility] facility fees as the owner of the establishment, and the other to represent the physician's services and subsequent fees. You can speak with your insurance carrier for a pricing estimate by calling the customer service number on the back of your card and ask about charges for a hospital outpatient visit.  If you do not already have a [employer] account, sign up at: [email or web address]  You can access your medical information, make appointments, see lab results,  billing information, and more.  If you have questions regarding this referral, please contact  the [facility] department at:             [phone]. Monday - Friday 7:30AM - 5:00PM.      Sincerely,  [facility]

## 2025-06-03 ENCOUNTER — TELEPHONE (OUTPATIENT)
Dept: HEALTH INFORMATION MANAGEMENT | Facility: OTHER | Age: 40
End: 2025-06-03
Payer: COMMERCIAL

## 2025-06-03 NOTE — TELEPHONE ENCOUNTER
Called patient to schedule New Patient Sleep Medicine appointment with use of , but patient states he is already scheduled with someone outside of [facility] and declined.

## 2025-08-13 ENCOUNTER — HOSPITAL ENCOUNTER (OUTPATIENT)
Dept: RADIOLOGY | Facility: MEDICAL CENTER | Age: 40
End: 2025-08-13
Attending: PHYSICIAN ASSISTANT
Payer: COMMERCIAL

## 2025-08-13 DIAGNOSIS — J96.11 CHRONIC HYPOXEMIC RESPIRATORY FAILURE (HCC): ICD-10-CM

## 2025-08-13 PROCEDURE — 71250 CT THORAX DX C-: CPT

## (undated) DEVICE — SUCTION INSTRUMENT YANKAUER BULBOUS TIP W/O VENT (50EA/CA)

## (undated) DEVICE — WATER IRRIG. STER 3000 ML - (4/CA)

## (undated) DEVICE — WATER IRRIGATION STERILE 1000ML (12EA/CA)

## (undated) DEVICE — HUMID-VENT HEAT AND MOISTURE EXCHANGE- (50/BX)

## (undated) DEVICE — CANISTER SUCTION RIGID RED 1500CC (40EA/CA)

## (undated) DEVICE — GOWN WARMING STANDARD FLEX - (30/CA)

## (undated) DEVICE — BAG SPONGE COUNT 10.25 X 32 - BLUE (250/CA)

## (undated) DEVICE — CATHETER FOLEY 30CC 18 FR - 2-WAY-100% SILICONE

## (undated) DEVICE — TOWEL STOP TIMEOUT SAFETY FLAG (40EA/CA)

## (undated) DEVICE — PACK CYSTOSCOPY III - (8/CA)

## (undated) DEVICE — MEDICINE CUP STERILE 2 OZ - (100/CA)

## (undated) DEVICE — TUBE CONNECTING SUCTION - CLEAR PLASTIC STERILE 72 IN (50EA/CA)

## (undated) DEVICE — SODIUM CHL IRRIGATION 0.9% 1000ML (12EA/CA)

## (undated) DEVICE — GLOVE BIOGEL PI INDICATOR SZ 8.0 SURGICAL PF LF -(50/BX 4BX/CA)

## (undated) DEVICE — SLEEVE VASO CALF MED - (10PR/CA)

## (undated) DEVICE — SENSOR OXIMETER ADULT SPO2 RD SET (20EA/BX)

## (undated) DEVICE — GOWN SURGEONS X-LARGE - DISP. (30/CA)

## (undated) DEVICE — GLOVE SZ 7.5 BIOGEL PI MICRO - PF LF (50PR/BX)

## (undated) DEVICE — LACTATED RINGERS INJ 1000 ML - (14EA/CA 60CA/PF)

## (undated) DEVICE — SODIUM CHL. IRRIGATION 0.9% 3000ML (4EA/CA 65CA/PF)

## (undated) DEVICE — ELECTRODE DUAL RETURN W/ CORD - (50/PK)

## (undated) DEVICE — WIRE GUIDE SENSOR DUAL FLEX - 5/BX

## (undated) DEVICE — TUBING CLEARLINK DUO-VENT - C-FLO (48EA/CA)

## (undated) DEVICE — BAG DRAINAGE URINARY CLOSED 2000ML (20EA/CA)